# Patient Record
Sex: MALE | Race: BLACK OR AFRICAN AMERICAN | NOT HISPANIC OR LATINO | Employment: OTHER
[De-identification: names, ages, dates, MRNs, and addresses within clinical notes are randomized per-mention and may not be internally consistent; named-entity substitution may affect disease eponyms.]

---

## 2017-02-09 ENCOUNTER — RECORDS - HEALTHEAST (OUTPATIENT)
Dept: ADMINISTRATIVE | Facility: OTHER | Age: 62
End: 2017-02-09

## 2017-03-30 ENCOUNTER — COMMUNICATION - HEALTHEAST (OUTPATIENT)
Dept: FAMILY MEDICINE | Facility: CLINIC | Age: 62
End: 2017-03-30

## 2017-03-30 DIAGNOSIS — I10 HTN (HYPERTENSION): ICD-10-CM

## 2017-06-06 ENCOUNTER — OFFICE VISIT - HEALTHEAST (OUTPATIENT)
Dept: FAMILY MEDICINE | Facility: CLINIC | Age: 62
End: 2017-06-06

## 2017-06-06 DIAGNOSIS — E78.5 HYPERLIPIDEMIA: ICD-10-CM

## 2017-06-06 DIAGNOSIS — E11.9 DIABETES MELLITUS (H): ICD-10-CM

## 2017-06-06 DIAGNOSIS — Z00.00 ROUTINE GENERAL MEDICAL EXAMINATION AT A HEALTH CARE FACILITY: ICD-10-CM

## 2017-06-06 DIAGNOSIS — M16.9 DEGENERATIVE JOINT DISEASE (DJD) OF HIP: ICD-10-CM

## 2017-06-06 DIAGNOSIS — I10 HTN (HYPERTENSION): ICD-10-CM

## 2017-06-06 LAB
CHOLEST SERPL-MCNC: 161 MG/DL
FASTING STATUS PATIENT QL REPORTED: YES
HBA1C MFR BLD: 6.5 % (ref 3.5–6)
HDLC SERPL-MCNC: 43 MG/DL
LDLC SERPL CALC-MCNC: 105 MG/DL
PSA SERPL-MCNC: 0.4 NG/ML (ref 0–4.5)
TRIGL SERPL-MCNC: 63 MG/DL

## 2017-06-06 ASSESSMENT — MIFFLIN-ST. JEOR: SCORE: 1552.37

## 2017-06-08 ENCOUNTER — COMMUNICATION - HEALTHEAST (OUTPATIENT)
Dept: FAMILY MEDICINE | Facility: CLINIC | Age: 62
End: 2017-06-08

## 2017-06-28 ENCOUNTER — COMMUNICATION - HEALTHEAST (OUTPATIENT)
Dept: FAMILY MEDICINE | Facility: CLINIC | Age: 62
End: 2017-06-28

## 2017-06-28 DIAGNOSIS — E78.5 HYPERLIPIDEMIA: ICD-10-CM

## 2017-10-24 ENCOUNTER — COMMUNICATION - HEALTHEAST (OUTPATIENT)
Dept: FAMILY MEDICINE | Facility: CLINIC | Age: 62
End: 2017-10-24

## 2017-10-24 DIAGNOSIS — I10 HTN (HYPERTENSION): ICD-10-CM

## 2017-10-24 DIAGNOSIS — E11.9 DIABETES MELLITUS (H): ICD-10-CM

## 2018-02-15 ENCOUNTER — COMMUNICATION - HEALTHEAST (OUTPATIENT)
Dept: FAMILY MEDICINE | Facility: CLINIC | Age: 63
End: 2018-02-15

## 2018-02-15 DIAGNOSIS — E11.9 DIABETES MELLITUS (H): ICD-10-CM

## 2018-05-21 ENCOUNTER — COMMUNICATION - HEALTHEAST (OUTPATIENT)
Dept: FAMILY MEDICINE | Facility: CLINIC | Age: 63
End: 2018-05-21

## 2018-05-21 DIAGNOSIS — E11.9 DIABETES MELLITUS (H): ICD-10-CM

## 2018-06-22 ENCOUNTER — COMMUNICATION - HEALTHEAST (OUTPATIENT)
Dept: FAMILY MEDICINE | Facility: CLINIC | Age: 63
End: 2018-06-22

## 2018-06-22 DIAGNOSIS — E11.9 DIABETES MELLITUS (H): ICD-10-CM

## 2018-07-02 ENCOUNTER — COMMUNICATION - HEALTHEAST (OUTPATIENT)
Dept: FAMILY MEDICINE | Facility: CLINIC | Age: 63
End: 2018-07-02

## 2018-07-02 DIAGNOSIS — E11.9 DIABETES MELLITUS (H): ICD-10-CM

## 2018-07-18 ENCOUNTER — COMMUNICATION - HEALTHEAST (OUTPATIENT)
Dept: FAMILY MEDICINE | Facility: CLINIC | Age: 63
End: 2018-07-18

## 2018-07-18 DIAGNOSIS — E11.9 DIABETES MELLITUS (H): ICD-10-CM

## 2018-07-30 ENCOUNTER — COMMUNICATION - HEALTHEAST (OUTPATIENT)
Dept: FAMILY MEDICINE | Facility: CLINIC | Age: 63
End: 2018-07-30

## 2018-07-31 ENCOUNTER — OFFICE VISIT - HEALTHEAST (OUTPATIENT)
Dept: FAMILY MEDICINE | Facility: CLINIC | Age: 63
End: 2018-07-31

## 2018-07-31 DIAGNOSIS — E11.9 TYPE 2 DIABETES MELLITUS WITHOUT COMPLICATION, WITHOUT LONG-TERM CURRENT USE OF INSULIN (H): ICD-10-CM

## 2018-07-31 DIAGNOSIS — E78.5 HYPERLIPIDEMIA: ICD-10-CM

## 2018-07-31 DIAGNOSIS — I10 HTN (HYPERTENSION): ICD-10-CM

## 2018-07-31 DIAGNOSIS — Z78.9 ALCOHOL USE: ICD-10-CM

## 2018-07-31 DIAGNOSIS — N52.9 ED (ERECTILE DYSFUNCTION): ICD-10-CM

## 2018-07-31 DIAGNOSIS — R79.89 ELEVATED LFTS: ICD-10-CM

## 2018-07-31 LAB
ALBUMIN SERPL-MCNC: 3.8 G/DL (ref 3.5–5)
ALP SERPL-CCNC: 69 U/L (ref 45–120)
ALT SERPL W P-5'-P-CCNC: 51 U/L (ref 0–45)
ANION GAP SERPL CALCULATED.3IONS-SCNC: 10 MMOL/L (ref 5–18)
AST SERPL W P-5'-P-CCNC: 54 U/L (ref 0–40)
BILIRUB SERPL-MCNC: 0.6 MG/DL (ref 0–1)
BUN SERPL-MCNC: 10 MG/DL (ref 8–22)
CALCIUM SERPL-MCNC: 9.7 MG/DL (ref 8.5–10.5)
CHLORIDE BLD-SCNC: 101 MMOL/L (ref 98–107)
CHOLEST SERPL-MCNC: 177 MG/DL
CO2 SERPL-SCNC: 27 MMOL/L (ref 22–31)
CREAT SERPL-MCNC: 0.84 MG/DL (ref 0.7–1.3)
CREAT UR-MCNC: 412.9 MG/DL
FASTING STATUS PATIENT QL REPORTED: NORMAL
GFR SERPL CREATININE-BSD FRML MDRD: >60 ML/MIN/1.73M2
GLUCOSE BLD-MCNC: 139 MG/DL (ref 70–125)
HBA1C MFR BLD: 6.8 % (ref 3.5–6)
HDLC SERPL-MCNC: 44 MG/DL
LDLC SERPL CALC-MCNC: 116 MG/DL
MICROALBUMIN UR-MCNC: 2.01 MG/DL (ref 0–1.99)
MICROALBUMIN/CREAT UR: 4.9 MG/G
POTASSIUM BLD-SCNC: 4.2 MMOL/L (ref 3.5–5)
PROT SERPL-MCNC: 7.4 G/DL (ref 6–8)
SODIUM SERPL-SCNC: 138 MMOL/L (ref 136–145)
TRIGL SERPL-MCNC: 87 MG/DL

## 2018-08-02 ENCOUNTER — AMBULATORY - HEALTHEAST (OUTPATIENT)
Dept: FAMILY MEDICINE | Facility: CLINIC | Age: 63
End: 2018-08-02

## 2018-08-02 DIAGNOSIS — E11.9 TYPE 2 DIABETES MELLITUS WITHOUT COMPLICATION, WITHOUT LONG-TERM CURRENT USE OF INSULIN (H): ICD-10-CM

## 2018-08-21 ENCOUNTER — COMMUNICATION - HEALTHEAST (OUTPATIENT)
Dept: FAMILY MEDICINE | Facility: CLINIC | Age: 63
End: 2018-08-21

## 2018-08-21 DIAGNOSIS — I10 HTN (HYPERTENSION): ICD-10-CM

## 2018-08-22 ENCOUNTER — COMMUNICATION - HEALTHEAST (OUTPATIENT)
Dept: FAMILY MEDICINE | Facility: CLINIC | Age: 63
End: 2018-08-22

## 2018-08-22 DIAGNOSIS — E11.9 DIABETES MELLITUS (H): ICD-10-CM

## 2018-09-04 ENCOUNTER — COMMUNICATION - HEALTHEAST (OUTPATIENT)
Dept: FAMILY MEDICINE | Facility: CLINIC | Age: 63
End: 2018-09-04

## 2018-10-10 ENCOUNTER — RECORDS - HEALTHEAST (OUTPATIENT)
Dept: GENERAL RADIOLOGY | Facility: CLINIC | Age: 63
End: 2018-10-10

## 2018-10-10 ENCOUNTER — OFFICE VISIT - HEALTHEAST (OUTPATIENT)
Dept: FAMILY MEDICINE | Facility: CLINIC | Age: 63
End: 2018-10-10

## 2018-10-10 DIAGNOSIS — R10.9 ABDOMINAL PAIN: ICD-10-CM

## 2018-10-10 DIAGNOSIS — R80.9 MICROALBUMINURIA: ICD-10-CM

## 2018-10-10 DIAGNOSIS — M25.561 BILATERAL KNEE PAIN: ICD-10-CM

## 2018-10-10 DIAGNOSIS — R10.9 UNSPECIFIED ABDOMINAL PAIN: ICD-10-CM

## 2018-10-10 DIAGNOSIS — E78.5 HYPERLIPIDEMIA: ICD-10-CM

## 2018-10-10 DIAGNOSIS — M17.12 PRIMARY OSTEOARTHRITIS OF LEFT KNEE: ICD-10-CM

## 2018-10-10 DIAGNOSIS — K42.9 UMBILICAL HERNIA WITHOUT OBSTRUCTION AND WITHOUT GANGRENE: ICD-10-CM

## 2018-10-10 DIAGNOSIS — M25.562 BILATERAL KNEE PAIN: ICD-10-CM

## 2018-10-10 DIAGNOSIS — M25.561 PAIN IN RIGHT KNEE: ICD-10-CM

## 2018-10-10 DIAGNOSIS — M25.562 PAIN IN LEFT KNEE: ICD-10-CM

## 2018-10-10 DIAGNOSIS — E11.9 TYPE 2 DIABETES MELLITUS WITHOUT COMPLICATION, WITHOUT LONG-TERM CURRENT USE OF INSULIN (H): ICD-10-CM

## 2018-10-10 DIAGNOSIS — K59.00 CONSTIPATION: ICD-10-CM

## 2018-10-10 DIAGNOSIS — M17.11 PRIMARY OSTEOARTHRITIS OF RIGHT KNEE: ICD-10-CM

## 2018-12-05 ENCOUNTER — OFFICE VISIT - HEALTHEAST (OUTPATIENT)
Dept: FAMILY MEDICINE | Facility: CLINIC | Age: 63
End: 2018-12-05

## 2018-12-05 DIAGNOSIS — M17.11 PRIMARY OSTEOARTHRITIS OF RIGHT KNEE: ICD-10-CM

## 2018-12-05 DIAGNOSIS — E11.9 TYPE 2 DIABETES MELLITUS WITHOUT COMPLICATION, WITHOUT LONG-TERM CURRENT USE OF INSULIN (H): ICD-10-CM

## 2018-12-05 DIAGNOSIS — R80.9 MICROALBUMINURIA: ICD-10-CM

## 2018-12-05 DIAGNOSIS — E11.9 DIABETES MELLITUS (H): ICD-10-CM

## 2018-12-05 DIAGNOSIS — E78.5 HYPERLIPIDEMIA, UNSPECIFIED HYPERLIPIDEMIA TYPE: ICD-10-CM

## 2018-12-05 DIAGNOSIS — J40 BRONCHITIS: ICD-10-CM

## 2018-12-05 DIAGNOSIS — I10 ESSENTIAL HYPERTENSION: ICD-10-CM

## 2018-12-05 DIAGNOSIS — M54.6 LEFT-SIDED THORACIC BACK PAIN, UNSPECIFIED CHRONICITY: ICD-10-CM

## 2018-12-05 LAB — HBA1C MFR BLD: 10.5 % (ref 3.5–6)

## 2018-12-06 ENCOUNTER — COMMUNICATION - HEALTHEAST (OUTPATIENT)
Dept: FAMILY MEDICINE | Facility: CLINIC | Age: 63
End: 2018-12-06

## 2018-12-11 ENCOUNTER — COMMUNICATION - HEALTHEAST (OUTPATIENT)
Dept: SCHEDULING | Facility: CLINIC | Age: 63
End: 2018-12-11

## 2019-01-31 ENCOUNTER — COMMUNICATION - HEALTHEAST (OUTPATIENT)
Dept: FAMILY MEDICINE | Facility: CLINIC | Age: 64
End: 2019-01-31

## 2019-01-31 DIAGNOSIS — E11.9 TYPE 2 DIABETES MELLITUS WITHOUT COMPLICATION, WITHOUT LONG-TERM CURRENT USE OF INSULIN (H): ICD-10-CM

## 2019-01-31 DIAGNOSIS — M54.6 LEFT-SIDED THORACIC BACK PAIN, UNSPECIFIED CHRONICITY: ICD-10-CM

## 2019-02-27 ENCOUNTER — COMMUNICATION - HEALTHEAST (OUTPATIENT)
Dept: FAMILY MEDICINE | Facility: CLINIC | Age: 64
End: 2019-02-27

## 2019-02-27 DIAGNOSIS — E11.9 DIABETES MELLITUS (H): ICD-10-CM

## 2019-04-24 ENCOUNTER — COMMUNICATION - HEALTHEAST (OUTPATIENT)
Dept: FAMILY MEDICINE | Facility: CLINIC | Age: 64
End: 2019-04-24

## 2019-04-24 DIAGNOSIS — M17.11 PRIMARY OSTEOARTHRITIS OF RIGHT KNEE: ICD-10-CM

## 2019-06-09 ENCOUNTER — COMMUNICATION - HEALTHEAST (OUTPATIENT)
Dept: FAMILY MEDICINE | Facility: CLINIC | Age: 64
End: 2019-06-09

## 2019-06-09 DIAGNOSIS — E11.9 TYPE 2 DIABETES MELLITUS WITHOUT COMPLICATION, WITHOUT LONG-TERM CURRENT USE OF INSULIN (H): ICD-10-CM

## 2019-07-30 ENCOUNTER — COMMUNICATION - HEALTHEAST (OUTPATIENT)
Dept: FAMILY MEDICINE | Facility: CLINIC | Age: 64
End: 2019-07-30

## 2019-07-30 DIAGNOSIS — E11.9 DIABETES MELLITUS (H): ICD-10-CM

## 2019-07-30 DIAGNOSIS — I10 HTN (HYPERTENSION): ICD-10-CM

## 2019-07-30 DIAGNOSIS — E11.9 TYPE 2 DIABETES MELLITUS WITHOUT COMPLICATION, WITHOUT LONG-TERM CURRENT USE OF INSULIN (H): ICD-10-CM

## 2019-08-02 ENCOUNTER — COMMUNICATION - HEALTHEAST (OUTPATIENT)
Dept: FAMILY MEDICINE | Facility: CLINIC | Age: 64
End: 2019-08-02

## 2019-08-02 DIAGNOSIS — M17.11 PRIMARY OSTEOARTHRITIS OF RIGHT KNEE: ICD-10-CM

## 2019-08-06 ENCOUNTER — COMMUNICATION - HEALTHEAST (OUTPATIENT)
Dept: FAMILY MEDICINE | Facility: CLINIC | Age: 64
End: 2019-08-06

## 2019-08-21 ENCOUNTER — OFFICE VISIT - HEALTHEAST (OUTPATIENT)
Dept: FAMILY MEDICINE | Facility: CLINIC | Age: 64
End: 2019-08-21

## 2019-08-21 DIAGNOSIS — M17.12 PRIMARY OSTEOARTHRITIS OF LEFT KNEE: ICD-10-CM

## 2019-08-21 DIAGNOSIS — N52.9 ED (ERECTILE DYSFUNCTION): ICD-10-CM

## 2019-08-21 DIAGNOSIS — M54.6 LEFT-SIDED THORACIC BACK PAIN, UNSPECIFIED CHRONICITY: ICD-10-CM

## 2019-08-21 DIAGNOSIS — E11.9 DIABETES MELLITUS (H): ICD-10-CM

## 2019-08-21 DIAGNOSIS — E11.9 TYPE 2 DIABETES MELLITUS WITHOUT COMPLICATION, WITHOUT LONG-TERM CURRENT USE OF INSULIN (H): ICD-10-CM

## 2019-08-21 DIAGNOSIS — E78.5 HYPERLIPIDEMIA, UNSPECIFIED HYPERLIPIDEMIA TYPE: ICD-10-CM

## 2019-08-21 DIAGNOSIS — I10 HTN (HYPERTENSION): ICD-10-CM

## 2019-08-21 DIAGNOSIS — M17.11 PRIMARY OSTEOARTHRITIS OF RIGHT KNEE: ICD-10-CM

## 2019-08-21 DIAGNOSIS — R10.9 ABDOMINAL PAIN: ICD-10-CM

## 2019-08-21 LAB
ALBUMIN SERPL-MCNC: 3.9 G/DL (ref 3.5–5)
ALP SERPL-CCNC: 88 U/L (ref 45–120)
ALT SERPL W P-5'-P-CCNC: 31 U/L (ref 0–45)
ANION GAP SERPL CALCULATED.3IONS-SCNC: 10 MMOL/L (ref 5–18)
AST SERPL W P-5'-P-CCNC: 23 U/L (ref 0–40)
BILIRUB SERPL-MCNC: 0.4 MG/DL (ref 0–1)
BUN SERPL-MCNC: 12 MG/DL (ref 8–22)
CALCIUM SERPL-MCNC: 9.9 MG/DL (ref 8.5–10.5)
CHLORIDE BLD-SCNC: 101 MMOL/L (ref 98–107)
CHOLEST SERPL-MCNC: 124 MG/DL
CO2 SERPL-SCNC: 27 MMOL/L (ref 22–31)
CREAT SERPL-MCNC: 1.06 MG/DL (ref 0.7–1.3)
FASTING STATUS PATIENT QL REPORTED: NO
GFR SERPL CREATININE-BSD FRML MDRD: >60 ML/MIN/1.73M2
GLUCOSE BLD-MCNC: 154 MG/DL (ref 70–125)
HBA1C MFR BLD: 8.2 % (ref 3.5–6)
HDLC SERPL-MCNC: 39 MG/DL
LDLC SERPL CALC-MCNC: 66 MG/DL
POTASSIUM BLD-SCNC: 4.6 MMOL/L (ref 3.5–5)
PROT SERPL-MCNC: 7.6 G/DL (ref 6–8)
SODIUM SERPL-SCNC: 138 MMOL/L (ref 136–145)
TRIGL SERPL-MCNC: 93 MG/DL

## 2019-08-22 ENCOUNTER — COMMUNICATION - HEALTHEAST (OUTPATIENT)
Dept: FAMILY MEDICINE | Facility: CLINIC | Age: 64
End: 2019-08-22

## 2019-10-28 ENCOUNTER — COMMUNICATION - HEALTHEAST (OUTPATIENT)
Dept: FAMILY MEDICINE | Facility: CLINIC | Age: 64
End: 2019-10-28

## 2019-10-28 DIAGNOSIS — I10 ESSENTIAL HYPERTENSION: ICD-10-CM

## 2019-11-25 ENCOUNTER — OFFICE VISIT - HEALTHEAST (OUTPATIENT)
Dept: FAMILY MEDICINE | Facility: CLINIC | Age: 64
End: 2019-11-25

## 2019-11-25 DIAGNOSIS — I10 ESSENTIAL HYPERTENSION: ICD-10-CM

## 2019-11-25 DIAGNOSIS — M16.9 OSTEOARTHRITIS OF HIP, UNSPECIFIED LATERALITY, UNSPECIFIED OSTEOARTHRITIS TYPE: ICD-10-CM

## 2019-11-25 DIAGNOSIS — J98.01 BRONCHOSPASM: ICD-10-CM

## 2019-11-25 DIAGNOSIS — I10 HTN (HYPERTENSION): ICD-10-CM

## 2019-11-25 DIAGNOSIS — E11.9 TYPE 2 DIABETES MELLITUS WITHOUT COMPLICATION, WITHOUT LONG-TERM CURRENT USE OF INSULIN (H): ICD-10-CM

## 2019-11-25 DIAGNOSIS — E11.9 DIABETES MELLITUS (H): ICD-10-CM

## 2019-11-25 LAB — HBA1C MFR BLD: 7.8 % (ref 3.5–6)

## 2019-11-25 ASSESSMENT — MIFFLIN-ST. JEOR: SCORE: 1627.21

## 2019-12-02 ENCOUNTER — COMMUNICATION - HEALTHEAST (OUTPATIENT)
Dept: FAMILY MEDICINE | Facility: CLINIC | Age: 64
End: 2019-12-02

## 2019-12-25 ENCOUNTER — COMMUNICATION - HEALTHEAST (OUTPATIENT)
Dept: FAMILY MEDICINE | Facility: CLINIC | Age: 64
End: 2019-12-25

## 2019-12-25 DIAGNOSIS — M16.9 OSTEOARTHRITIS OF HIP, UNSPECIFIED LATERALITY, UNSPECIFIED OSTEOARTHRITIS TYPE: ICD-10-CM

## 2020-03-16 ENCOUNTER — COMMUNICATION - HEALTHEAST (OUTPATIENT)
Dept: FAMILY MEDICINE | Facility: CLINIC | Age: 65
End: 2020-03-16

## 2020-03-16 DIAGNOSIS — M16.9 OSTEOARTHRITIS OF HIP, UNSPECIFIED LATERALITY, UNSPECIFIED OSTEOARTHRITIS TYPE: ICD-10-CM

## 2020-03-16 DIAGNOSIS — E11.9 TYPE 2 DIABETES MELLITUS WITHOUT COMPLICATION, WITHOUT LONG-TERM CURRENT USE OF INSULIN (H): ICD-10-CM

## 2020-03-16 DIAGNOSIS — I10 HTN (HYPERTENSION): ICD-10-CM

## 2020-05-04 ENCOUNTER — COMMUNICATION - HEALTHEAST (OUTPATIENT)
Dept: GERIATRIC MEDICINE | Facility: CLINIC | Age: 65
End: 2020-05-04

## 2020-06-10 ENCOUNTER — COMMUNICATION - HEALTHEAST (OUTPATIENT)
Dept: FAMILY MEDICINE | Facility: CLINIC | Age: 65
End: 2020-06-10

## 2020-06-23 ENCOUNTER — COMMUNICATION - HEALTHEAST (OUTPATIENT)
Dept: FAMILY MEDICINE | Facility: CLINIC | Age: 65
End: 2020-06-23

## 2020-07-09 ENCOUNTER — COMMUNICATION - HEALTHEAST (OUTPATIENT)
Dept: FAMILY MEDICINE | Facility: CLINIC | Age: 65
End: 2020-07-09

## 2020-07-09 DIAGNOSIS — M16.9 OSTEOARTHRITIS OF HIP, UNSPECIFIED LATERALITY, UNSPECIFIED OSTEOARTHRITIS TYPE: ICD-10-CM

## 2020-07-22 ENCOUNTER — COMMUNICATION - HEALTHEAST (OUTPATIENT)
Dept: FAMILY MEDICINE | Facility: CLINIC | Age: 65
End: 2020-07-22

## 2020-09-06 ENCOUNTER — COMMUNICATION - HEALTHEAST (OUTPATIENT)
Dept: FAMILY MEDICINE | Facility: CLINIC | Age: 65
End: 2020-09-06

## 2020-09-06 DIAGNOSIS — I10 ESSENTIAL HYPERTENSION: ICD-10-CM

## 2020-09-06 DIAGNOSIS — E11.9 DIABETES MELLITUS (H): ICD-10-CM

## 2020-10-06 ENCOUNTER — COMMUNICATION - HEALTHEAST (OUTPATIENT)
Dept: FAMILY MEDICINE | Facility: CLINIC | Age: 65
End: 2020-10-06

## 2020-10-06 DIAGNOSIS — E11.9 DIABETES MELLITUS (H): ICD-10-CM

## 2020-10-06 DIAGNOSIS — I10 ESSENTIAL HYPERTENSION: ICD-10-CM

## 2020-10-08 ENCOUNTER — COMMUNICATION - HEALTHEAST (OUTPATIENT)
Dept: FAMILY MEDICINE | Facility: CLINIC | Age: 65
End: 2020-10-08

## 2020-10-12 ENCOUNTER — COMMUNICATION - HEALTHEAST (OUTPATIENT)
Dept: FAMILY MEDICINE | Facility: CLINIC | Age: 65
End: 2020-10-12

## 2020-10-12 DIAGNOSIS — I10 ESSENTIAL HYPERTENSION: ICD-10-CM

## 2020-10-12 DIAGNOSIS — M16.9 OSTEOARTHRITIS OF HIP, UNSPECIFIED LATERALITY, UNSPECIFIED OSTEOARTHRITIS TYPE: ICD-10-CM

## 2020-10-22 ENCOUNTER — COMMUNICATION - HEALTHEAST (OUTPATIENT)
Dept: FAMILY MEDICINE | Facility: CLINIC | Age: 65
End: 2020-10-22

## 2020-10-22 DIAGNOSIS — E11.9 DIABETES MELLITUS (H): ICD-10-CM

## 2020-10-22 DIAGNOSIS — I10 ESSENTIAL HYPERTENSION: ICD-10-CM

## 2020-11-03 ENCOUNTER — COMMUNICATION - HEALTHEAST (OUTPATIENT)
Dept: FAMILY MEDICINE | Facility: CLINIC | Age: 65
End: 2020-11-03

## 2020-11-09 ENCOUNTER — OFFICE VISIT - HEALTHEAST (OUTPATIENT)
Dept: FAMILY MEDICINE | Facility: CLINIC | Age: 65
End: 2020-11-09

## 2020-11-09 DIAGNOSIS — J98.01 BRONCHOSPASM: ICD-10-CM

## 2020-11-09 DIAGNOSIS — R80.9 MICROALBUMINURIA: ICD-10-CM

## 2020-11-09 DIAGNOSIS — E11.29 TYPE 2 DIABETES MELLITUS WITH MICROALBUMINURIA, WITHOUT LONG-TERM CURRENT USE OF INSULIN (H): ICD-10-CM

## 2020-11-09 DIAGNOSIS — I10 ESSENTIAL HYPERTENSION: ICD-10-CM

## 2020-11-09 DIAGNOSIS — I10 HTN (HYPERTENSION): ICD-10-CM

## 2020-11-09 DIAGNOSIS — E11.9 TYPE 2 DIABETES MELLITUS WITHOUT COMPLICATION, WITHOUT LONG-TERM CURRENT USE OF INSULIN (H): ICD-10-CM

## 2020-11-09 DIAGNOSIS — E66.01 MORBID OBESITY (H): ICD-10-CM

## 2020-11-09 DIAGNOSIS — Z12.11 COLON CANCER SCREENING: ICD-10-CM

## 2020-11-09 DIAGNOSIS — M16.9 OSTEOARTHRITIS OF HIP, UNSPECIFIED LATERALITY, UNSPECIFIED OSTEOARTHRITIS TYPE: ICD-10-CM

## 2020-11-09 DIAGNOSIS — E11.9 DIABETES MELLITUS (H): ICD-10-CM

## 2020-11-09 DIAGNOSIS — E78.5 HYPERLIPIDEMIA, UNSPECIFIED HYPERLIPIDEMIA TYPE: ICD-10-CM

## 2020-11-09 DIAGNOSIS — R80.9 TYPE 2 DIABETES MELLITUS WITH MICROALBUMINURIA, WITHOUT LONG-TERM CURRENT USE OF INSULIN (H): ICD-10-CM

## 2020-11-17 ENCOUNTER — COMMUNICATION - HEALTHEAST (OUTPATIENT)
Dept: GERIATRIC MEDICINE | Facility: CLINIC | Age: 65
End: 2020-11-17

## 2020-11-18 ENCOUNTER — COMMUNICATION - HEALTHEAST (OUTPATIENT)
Dept: GERIATRIC MEDICINE | Facility: CLINIC | Age: 65
End: 2020-11-18

## 2020-11-18 ASSESSMENT — ACTIVITIES OF DAILY LIVING (ADL): DEPENDENT_IADLS:: CLEANING;COOKING;LAUNDRY;SHOPPING;MEAL PREPARATION;TRANSPORTATION

## 2020-11-27 ENCOUNTER — COMMUNICATION - HEALTHEAST (OUTPATIENT)
Dept: GERIATRIC MEDICINE | Facility: CLINIC | Age: 65
End: 2020-11-27

## 2020-11-30 ENCOUNTER — RECORDS - HEALTHEAST (OUTPATIENT)
Dept: ADMINISTRATIVE | Facility: OTHER | Age: 65
End: 2020-11-30

## 2021-01-01 ENCOUNTER — PATIENT OUTREACH (OUTPATIENT)
Dept: GERIATRIC MEDICINE | Facility: CLINIC | Age: 66
End: 2021-01-01

## 2021-01-01 ENCOUNTER — COMMUNICATION - HEALTHEAST (OUTPATIENT)
Dept: FAMILY MEDICINE | Facility: CLINIC | Age: 66
End: 2021-01-01

## 2021-01-01 ENCOUNTER — RECORDS - HEALTHEAST (OUTPATIENT)
Dept: FAMILY MEDICINE | Facility: CLINIC | Age: 66
End: 2021-01-01

## 2021-01-01 ENCOUNTER — PATIENT OUTREACH (OUTPATIENT)
Dept: GERIATRIC MEDICINE | Facility: CLINIC | Age: 66
End: 2021-01-01
Payer: COMMERCIAL

## 2021-01-01 ENCOUNTER — CARE COORDINATION (OUTPATIENT)
Dept: GERIATRIC MEDICINE | Facility: CLINIC | Age: 66
End: 2021-01-01
Payer: COMMERCIAL

## 2021-01-01 ENCOUNTER — COMMUNICATION - HEALTHEAST (OUTPATIENT)
Dept: TELEHEALTH | Facility: CLINIC | Age: 66
End: 2021-01-01

## 2021-01-01 ENCOUNTER — OFFICE VISIT - HEALTHEAST (OUTPATIENT)
Dept: FAMILY MEDICINE | Facility: CLINIC | Age: 66
End: 2021-01-01

## 2021-01-01 ENCOUNTER — TELEPHONE (OUTPATIENT)
Dept: FAMILY MEDICINE | Facility: CLINIC | Age: 66
End: 2021-01-01
Payer: COMMERCIAL

## 2021-01-01 ENCOUNTER — OFFICE VISIT (OUTPATIENT)
Dept: FAMILY MEDICINE | Facility: CLINIC | Age: 66
End: 2021-01-01
Payer: COMMERCIAL

## 2021-01-01 ENCOUNTER — COMMUNICATION - HEALTHEAST (OUTPATIENT)
Dept: GERIATRIC MEDICINE | Facility: CLINIC | Age: 66
End: 2021-01-01

## 2021-01-01 VITALS — BODY MASS INDEX: 32.96 KG/M2 | WEIGHT: 192 LBS

## 2021-01-01 VITALS
DIASTOLIC BLOOD PRESSURE: 70 MMHG | HEART RATE: 67 BPM | SYSTOLIC BLOOD PRESSURE: 127 MMHG | WEIGHT: 194 LBS | HEIGHT: 64 IN | BODY MASS INDEX: 33.12 KG/M2

## 2021-01-01 VITALS
DIASTOLIC BLOOD PRESSURE: 58 MMHG | BODY MASS INDEX: 35.42 KG/M2 | WEIGHT: 207.5 LBS | RESPIRATION RATE: 18 BRPM | SYSTOLIC BLOOD PRESSURE: 116 MMHG | HEART RATE: 75 BPM | HEIGHT: 64 IN

## 2021-01-01 VITALS — WEIGHT: 200 LBS | BODY MASS INDEX: 34.33 KG/M2

## 2021-01-01 VITALS — BODY MASS INDEX: 34.33 KG/M2 | WEIGHT: 200 LBS

## 2021-01-01 VITALS — WEIGHT: 208 LBS | BODY MASS INDEX: 35.7 KG/M2

## 2021-01-01 VITALS — WEIGHT: 191 LBS | BODY MASS INDEX: 32.61 KG/M2 | HEIGHT: 64 IN

## 2021-01-01 DIAGNOSIS — M16.9 OSTEOARTHRITIS OF HIP, UNSPECIFIED LATERALITY, UNSPECIFIED OSTEOARTHRITIS TYPE: ICD-10-CM

## 2021-01-01 DIAGNOSIS — R53.83 OTHER FATIGUE: ICD-10-CM

## 2021-01-01 DIAGNOSIS — E11.9 DIABETES MELLITUS (H): ICD-10-CM

## 2021-01-01 DIAGNOSIS — I10 ESSENTIAL HYPERTENSION: ICD-10-CM

## 2021-01-01 DIAGNOSIS — E78.5 HYPERLIPIDEMIA, UNSPECIFIED HYPERLIPIDEMIA TYPE: ICD-10-CM

## 2021-01-01 DIAGNOSIS — E11.9 TYPE 2 DIABETES MELLITUS WITHOUT COMPLICATION, WITHOUT LONG-TERM CURRENT USE OF INSULIN (H): ICD-10-CM

## 2021-01-01 DIAGNOSIS — I10 HTN (HYPERTENSION): Primary | ICD-10-CM

## 2021-01-01 DIAGNOSIS — R80.9 MICROALBUMINURIA: ICD-10-CM

## 2021-01-01 DIAGNOSIS — I10 HYPERTENSION: ICD-10-CM

## 2021-01-01 DIAGNOSIS — E11.29 TYPE 2 DIABETES MELLITUS WITH OTHER DIABETIC KIDNEY COMPLICATION (H): ICD-10-CM

## 2021-01-01 DIAGNOSIS — Z71.85 IMMUNIZATION COUNSELING: ICD-10-CM

## 2021-01-01 DIAGNOSIS — I10 BENIGN ESSENTIAL HYPERTENSION: ICD-10-CM

## 2021-01-01 DIAGNOSIS — I10 HTN (HYPERTENSION): ICD-10-CM

## 2021-01-01 DIAGNOSIS — J98.01 BRONCHOSPASM: ICD-10-CM

## 2021-01-01 DIAGNOSIS — R06.2 WHEEZING: ICD-10-CM

## 2021-01-01 DIAGNOSIS — M16.0 PRIMARY OSTEOARTHRITIS OF BOTH HIPS: ICD-10-CM

## 2021-01-01 DIAGNOSIS — E11.29 TYPE 2 DIABETES MELLITUS WITH OTHER DIABETIC KIDNEY COMPLICATION (H): Primary | ICD-10-CM

## 2021-01-01 DIAGNOSIS — E66.01 MORBID OBESITY (H): ICD-10-CM

## 2021-01-01 DIAGNOSIS — R80.9 TYPE 2 DIABETES MELLITUS WITH MICROALBUMINURIA, WITHOUT LONG-TERM CURRENT USE OF INSULIN (H): ICD-10-CM

## 2021-01-01 DIAGNOSIS — Z12.11 COLON CANCER SCREENING: ICD-10-CM

## 2021-01-01 DIAGNOSIS — E11.9 DIABETES MELLITUS (H): Primary | ICD-10-CM

## 2021-01-01 DIAGNOSIS — Z00.00 ENCOUNTER FOR MEDICARE ANNUAL WELLNESS EXAM: Primary | ICD-10-CM

## 2021-01-01 DIAGNOSIS — R35.1 NOCTURIA: ICD-10-CM

## 2021-01-01 DIAGNOSIS — I10 BENIGN ESSENTIAL HYPERTENSION: Primary | ICD-10-CM

## 2021-01-01 DIAGNOSIS — E11.29 TYPE 2 DIABETES MELLITUS WITH MICROALBUMINURIA, WITHOUT LONG-TERM CURRENT USE OF INSULIN (H): ICD-10-CM

## 2021-01-01 LAB
ALBUMIN SERPL-MCNC: 3.9 G/DL (ref 3.5–5)
ALP SERPL-CCNC: 99 U/L (ref 45–120)
ALT SERPL W P-5'-P-CCNC: 12 U/L (ref 0–45)
ANION GAP SERPL CALCULATED.3IONS-SCNC: 12 MMOL/L (ref 5–18)
AST SERPL W P-5'-P-CCNC: 10 U/L (ref 0–40)
BILIRUB SERPL-MCNC: 0.3 MG/DL (ref 0–1)
BUN SERPL-MCNC: 13 MG/DL (ref 8–22)
CALCIUM SERPL-MCNC: 9.7 MG/DL (ref 8.5–10.5)
CHLORIDE BLD-SCNC: 102 MMOL/L (ref 98–107)
CHOLEST SERPL-MCNC: 124 MG/DL
CO2 SERPL-SCNC: 24 MMOL/L (ref 22–31)
CREAT SERPL-MCNC: 1.39 MG/DL (ref 0.7–1.3)
CREAT UR-MCNC: 253 MG/DL
ERYTHROCYTE [DISTWIDTH] IN BLOOD BY AUTOMATED COUNT: 14 % (ref 10–15)
FASTING STATUS PATIENT QL REPORTED: ABNORMAL
GFR SERPL CREATININE-BSD FRML MDRD: 52 ML/MIN/1.73M2
GLUCOSE BLD-MCNC: 293 MG/DL (ref 70–125)
HBA1C MFR BLD: 10.8 % (ref 0–5.6)
HCT VFR BLD AUTO: 40.1 % (ref 40–53)
HDLC SERPL-MCNC: 33 MG/DL
HGB BLD-MCNC: 12.6 G/DL (ref 13.3–17.7)
LDLC SERPL CALC-MCNC: 77 MG/DL
MCH RBC QN AUTO: 26.4 PG (ref 26.5–33)
MCHC RBC AUTO-ENTMCNC: 31.4 G/DL (ref 31.5–36.5)
MCV RBC AUTO: 84 FL (ref 78–100)
MICROALBUMIN UR-MCNC: 6.39 MG/DL (ref 0–1.99)
MICROALBUMIN/CREAT UR: 25.3 MG/G CR
PLATELET # BLD AUTO: 494 10E3/UL (ref 150–450)
POTASSIUM BLD-SCNC: 4.1 MMOL/L (ref 3.5–5)
PROT SERPL-MCNC: 7.6 G/DL (ref 6–8)
PSA SERPL-MCNC: 0.4 UG/L (ref 0–4.5)
RBC # BLD AUTO: 4.77 10E6/UL (ref 4.4–5.9)
SODIUM SERPL-SCNC: 138 MMOL/L (ref 136–145)
TRIGL SERPL-MCNC: 71 MG/DL
WBC # BLD AUTO: 7.1 10E3/UL (ref 4–11)

## 2021-01-01 PROCEDURE — 85027 COMPLETE CBC AUTOMATED: CPT | Performed by: FAMILY MEDICINE

## 2021-01-01 PROCEDURE — 84153 ASSAY OF PSA TOTAL: CPT | Performed by: FAMILY MEDICINE

## 2021-01-01 PROCEDURE — 82043 UR ALBUMIN QUANTITATIVE: CPT | Performed by: FAMILY MEDICINE

## 2021-01-01 PROCEDURE — 80053 COMPREHEN METABOLIC PANEL: CPT | Performed by: FAMILY MEDICINE

## 2021-01-01 PROCEDURE — G0402 INITIAL PREVENTIVE EXAM: HCPCS | Performed by: FAMILY MEDICINE

## 2021-01-01 PROCEDURE — 80061 LIPID PANEL: CPT | Performed by: FAMILY MEDICINE

## 2021-01-01 PROCEDURE — 36415 COLL VENOUS BLD VENIPUNCTURE: CPT | Performed by: FAMILY MEDICINE

## 2021-01-01 PROCEDURE — 83036 HEMOGLOBIN GLYCOSYLATED A1C: CPT | Performed by: FAMILY MEDICINE

## 2021-01-01 RX ORDER — ACETAMINOPHEN 500 MG
TABLET ORAL
Qty: 120 TABLET | Refills: 3 | Status: SHIPPED | OUTPATIENT
Start: 2021-01-01

## 2021-01-01 RX ORDER — ALBUTEROL SULFATE 90 UG/1
AEROSOL, METERED RESPIRATORY (INHALATION)
COMMUNITY
Start: 2021-01-01 | End: 2021-01-01

## 2021-01-01 RX ORDER — ACETAMINOPHEN 500 MG
TABLET ORAL
COMMUNITY
Start: 2020-11-09 | End: 2021-01-01

## 2021-01-01 RX ORDER — ATORVASTATIN CALCIUM 10 MG/1
10 TABLET, FILM COATED ORAL DAILY
Qty: 90 TABLET | Refills: 1 | Status: SHIPPED | OUTPATIENT
Start: 2021-01-01 | End: 2021-01-01

## 2021-01-01 RX ORDER — ATORVASTATIN CALCIUM 10 MG/1
10 TABLET, FILM COATED ORAL DAILY
Qty: 10 TABLET | Refills: 0 | Status: SHIPPED | OUTPATIENT
Start: 2021-01-01 | End: 2021-01-01

## 2021-01-01 RX ORDER — AMLODIPINE BESYLATE 10 MG/1
TABLET ORAL
COMMUNITY
Start: 2021-01-01 | End: 2021-01-01

## 2021-01-01 RX ORDER — ATORVASTATIN CALCIUM 10 MG/1
10 TABLET, FILM COATED ORAL DAILY
Qty: 90 TABLET | Refills: 0 | Status: SHIPPED | OUTPATIENT
Start: 2021-01-01

## 2021-01-01 RX ORDER — AMLODIPINE BESYLATE 10 MG/1
10 TABLET ORAL DAILY
Qty: 10 TABLET | Refills: 0 | Status: CANCELLED | OUTPATIENT
Start: 2021-01-01

## 2021-01-01 RX ORDER — AMLODIPINE BESYLATE 10 MG/1
10 TABLET ORAL DAILY
Qty: 90 TABLET | Refills: 1 | Status: SHIPPED | OUTPATIENT
Start: 2021-01-01 | End: 2021-01-01

## 2021-01-01 RX ORDER — ALBUTEROL SULFATE 90 UG/1
AEROSOL, METERED RESPIRATORY (INHALATION)
Qty: 18 G | Refills: 1 | Status: SHIPPED | OUTPATIENT
Start: 2021-01-01

## 2021-01-01 RX ORDER — CELECOXIB 200 MG/1
CAPSULE ORAL
OUTPATIENT
Start: 2021-01-01

## 2021-01-01 RX ORDER — LISINOPRIL 20 MG/1
20 TABLET ORAL DAILY
Qty: 14 TABLET | Refills: 0 | Status: SHIPPED | OUTPATIENT
Start: 2021-01-01 | End: 2021-01-01

## 2021-01-01 RX ORDER — AMLODIPINE BESYLATE 10 MG/1
10 TABLET ORAL DAILY
Qty: 15 TABLET | Refills: 0 | Status: SHIPPED | OUTPATIENT
Start: 2021-01-01 | End: 2021-01-01

## 2021-01-01 RX ORDER — AMLODIPINE BESYLATE 10 MG/1
10 TABLET ORAL DAILY
Qty: 10 TABLET | Refills: 0 | Status: SHIPPED | OUTPATIENT
Start: 2021-01-01 | End: 2021-01-01

## 2021-01-01 RX ORDER — CELECOXIB 200 MG/1
CAPSULE ORAL
COMMUNITY
Start: 2021-01-01 | End: 2021-01-01

## 2021-01-01 RX ORDER — LISINOPRIL 40 MG/1
40 TABLET ORAL DAILY
Qty: 90 TABLET | Refills: 1 | Status: SHIPPED | OUTPATIENT
Start: 2021-01-01

## 2021-01-01 RX ORDER — ATORVASTATIN CALCIUM 10 MG/1
10 TABLET, FILM COATED ORAL DAILY
Qty: 10 TABLET | Refills: 0 | Status: CANCELLED | OUTPATIENT
Start: 2021-01-01

## 2021-01-01 RX ORDER — ATORVASTATIN CALCIUM 10 MG/1
10 TABLET, FILM COATED ORAL DAILY
Qty: 15 TABLET | Refills: 0 | Status: SHIPPED | OUTPATIENT
Start: 2021-01-01 | End: 2021-01-01

## 2021-01-01 RX ORDER — AMLODIPINE BESYLATE 10 MG/1
10 TABLET ORAL DAILY
Qty: 90 TABLET | Refills: 0 | Status: SHIPPED | OUTPATIENT
Start: 2021-01-01

## 2021-01-01 RX ORDER — ATORVASTATIN CALCIUM 10 MG/1
TABLET, FILM COATED ORAL
COMMUNITY
Start: 2021-01-01 | End: 2021-01-01

## 2021-01-01 ASSESSMENT — ACTIVITIES OF DAILY LIVING (ADL)
CURRENT_FUNCTION: TELEPHONE REQUIRES ASSISTANCE
DEPENDENT_IADLS:: CLEANING;COOKING;LAUNDRY;SHOPPING;MEAL PREPARATION;TRANSPORTATION
CURRENT_FUNCTION: LAUNDRY REQUIRES ASSISTANCE
CURRENT_FUNCTION: BATHING REQUIRES ASSISTANCE
CURRENT_FUNCTION: SHOPPING REQUIRES ASSISTANCE
CURRENT_FUNCTION: MONEY MANAGEMENT REQUIRES ASSISTANCE
CURRENT_FUNCTION: PREPARING MEALS REQUIRES ASSISTANCE
CURRENT_FUNCTION: HOUSEWORK REQUIRES ASSISTANCE
CURRENT_FUNCTION: MEDICATION ADMINISTRATION REQUIRES ASSISTANCE
CURRENT_FUNCTION: TRANSPORTATION REQUIRES ASSISTANCE

## 2021-01-01 ASSESSMENT — MIFFLIN-ST. JEOR: SCORE: 1558.73

## 2021-05-28 NOTE — TELEPHONE ENCOUNTER
RN cannot approve Refill Request    RN can NOT refill this medication med is not covered by policy/route to provider. Last office visit: 12/5/2018 Ethan Colunga MD Last Physical: 6/6/2017 Last MTM visit: Visit date not found Last visit same specialty: 12/5/2018 Ethan Colunga MD.  Next visit within 3 mo: Visit date not found  Next physical within 3 mo: Visit date not found      Mabel Horton, [facility] Triage/Med Refill 4/24/2019    Requested Prescriptions   Pending Prescriptions Disp Refills     diclofenac (VOLTAREN) 75 MG EC tablet [Pharmacy Med Name: DICLOFENAC SOD EC 75 MG TAB] 60 tablet 2     Sig: TAKE 1 TABLET BY MOUTH TWICE A DAY       There is no refill protocol information for this order

## 2021-05-29 NOTE — TELEPHONE ENCOUNTER
Refill Approved    Rx renewed per Medication Renewal Policy. Medication was last renewed on 1/31/19.    Molly Unger, [facility] Triage/Med Refill 6/10/2019     Requested Prescriptions   Pending Prescriptions Disp Refills     atorvastatin (LIPITOR) 10 MG tablet [Pharmacy Med Name: ATORVASTATIN 10 MG TABLET] 30 tablet 2     Sig: TAKE 1 TABLET BY MOUTH EVERY DAY       Statins Refill Protocol (Hmg CoA Reductase Inhibitors) Passed - 6/9/2019 12:33 PM        Passed - PCP or prescribing provider visit in past 12 months      Last office visit with prescriber/PCP: 12/5/2018 Ethan Colunga MD OR same dept: 12/5/2018 Ethan Colunga MD OR same specialty: 12/5/2018 Ethan Colunga MD  Last physical: 6/6/2017 Last MTM visit: Visit date not found   Next visit within 3 mo: Visit date not found  Next physical within 3 mo: Visit date not found  Prescriber OR PCP: Ethan Colunga MD  Last diagnosis associated with med order: 1. Type 2 diabetes mellitus without complication, without long-term current use of insulin  - atorvastatin (LIPITOR) 10 MG tablet [Pharmacy Med Name: ATORVASTATIN 10 MG TABLET]; TAKE 1 TABLET BY MOUTH EVERY DAY  Dispense: 30 tablet; Refill: 2    If protocol passes may refill for 12 months if within 3 months of last provider visit (or a total of 15 months).

## 2021-05-30 NOTE — TELEPHONE ENCOUNTER
ARDEN WALKED IN AND STATES THAT HE DOES NOT HAVE ANY REFILLS AND IS WONDERING IF DR GARCIA COULD REFILL FOR HIM ALL MEDICATIONS HE HAS BEEN OUT OF TOWN SINCE THE FIRST OF July  PATIENTS PHONE IS NOT WORKING RIGHT NOW AND HE STATES TO CALL [phone] HIS ROOMATES NUMBER BUT STATES THAT HE DOES NOT WANT HIS PHONE NUMBER TAKEN OUT AS HE WILL BE TURNING THEM BACK ON NEXT MONTH

## 2021-05-30 NOTE — TELEPHONE ENCOUNTER
Name of form/paperwork: Other:  Order form from [facility] for a Back support, Right and Left Knee supports  Have you been seen for this request: N/A  Do we have the form: Yes- Corrine states she faxed form on 7/17/19 for Provider to sign.  When is form needed by: Asap  How would you like the form returned: Fax  Fax Number: Fax number on form.  Patient Notified form requests are processed in 3-5 business days: N/A  (If patient needs form sooner, please note that in this message.)  Okay to leave a detailed message? Yes at: [phone], Ext. [phone]

## 2021-05-31 NOTE — TELEPHONE ENCOUNTER
Tell the patient that I sent in prescriptions for his amlodipine atorvastatin lisinopril and metformin.  All for 1 month supply.    He needs to make an appointment for follow-up visit within the next 2-3 weeks

## 2021-05-31 NOTE — TELEPHONE ENCOUNTER
Called patient and relayed the below message. Patient expressed understanding, 3 month appointment scheduled.

## 2021-05-31 NOTE — TELEPHONE ENCOUNTER
PT CAME INTO CLINIC REQUESTING FOR A REFILL FOR diclofenac (VOLTAREN) 75 MG EC tablet. PT DOES HAVE A FUTURE APPT ON 8/13/19 @ 2:45 PM. PT IS INFORMED THAT IT IS NOT GUARANTEED THAT PT WILL GET HIS REFILL OR NOT UNTIL HE IS SEEN. PLEASE CONTACT PT [phone] WHEN PRESCRIPTION IS REFILL.

## 2021-05-31 NOTE — TELEPHONE ENCOUNTER
RN cannot approve Refill Request    RN can NOT refill this medication med is not covered by policy/route to provider     . Last office visit: Visit date not found Last Physical: Visit date not found Last MTM visit: Visit date not found Last visit same specialty: 12/5/2018 Ethan Colunga MD.  Next visit within 3 mo: Visit date not found  Next physical within 3 mo: Visit date not found      Molly Unger, [facility] Triage/Med Refill 8/2/2019    Requested Prescriptions   Pending Prescriptions Disp Refills     diclofenac (VOLTAREN) 75 MG EC tablet [Pharmacy Med Name: DICLOFENAC SOD EC 75 MG TAB] 60 tablet 0     Sig: TAKE 1 TABLET BY MOUTH TWICE A DAY       There is no refill protocol information for this order

## 2021-05-31 NOTE — TELEPHONE ENCOUNTER
Spoke to Caroline from [facility]. Relayed message from provider to them. They will have patient schedule appointment to be seen in regards to request.

## 2021-05-31 NOTE — TELEPHONE ENCOUNTER
Please tell the caller from [facility] that I am not signing these forms.  Patient has never been seen for these problems and if he does come in we will discuss this with the patient.    Quit sending the forms I am not going to sign them

## 2021-05-31 NOTE — TELEPHONE ENCOUNTER
RN returned call to patient to inform him the Rx. He states that [facility] told him they don't have the Voltaren Rx. RN called [facility] to verify receipt of [facility]. [facility] said the Rx is waiting for him.  RN informed patient.     Maria Esther Phoenix RN, [facility] Med Refill/Triage, 8/6/2019 4:01 PM      *Ok to leave a detailed message upon call back

## 2021-05-31 NOTE — TELEPHONE ENCOUNTER
----- Message from Ethan Colunga MD sent at 8/22/2019 12:44 PM CDT -----  Please contact this patient, let him know that the blood work looked good the liver kidney electrolytes    The A1c was down to 8.2.  I like him to work harder on his diet trying to decrease carbohydrates rice bread pasta sugar alcohol pop.  The cholesterol level looked good.    Stand the same medications that we talked about yesterday and follow-up for recheck in 2 to 3 months

## 2021-05-31 NOTE — PROGRESS NOTES
Subjective: Patient comes in for follow-up he has not been in for about 9 months.    In December A1c was 10.5    He states his been on metformin 500 mg 2 twice a day he takes amlodipine and lisinopril for blood pressure    He is had a cough for about 2 years.  After evaluation and discussion I think it is from the lisinopril we will stop that and change to losartan 100 mg a day stay on the amlodipine 10 mg a day.    I am not sure if he is on the atorvastatin he did not bring in his medicines I did check lipid but sent a prescription for this as well as the metformin and amlodipine and losartan and also he should take an aspirin daily    The diclofenac has not been helpful enough for his musculoskeletal problems he does have some degenerative arthritis in his lower back also has had bilateral knee problems he does have moderate osteoarthritis on previous x-ray.  I discussed options including injection.  I need to have him see the orthopedist.    He will probably benefit from some knee braces.,  Await their evaluation.    I did stop the diclofenac and changed him over the naproxen.  He has normal renal function in the past.    He continues to smoke we discussed the need to stop.    Tobacco status: He  reports that he has been smoking cigarettes.  He started smoking about 4 years ago. He has never used smokeless tobacco.    Patient Active Problem List    Diagnosis Date Noted     Microalbuminuria 10/10/2018     Hyperlipidemia 05/14/2015     HTN (hypertension) 05/14/2015     Diabetes mellitus (H) 05/14/2015     DJD (degenerative joint disease) of hip 05/14/2015     Osteoarthrosis, pelvic region and thigh 05/17/2013       Current Outpatient Medications   Medication Sig Dispense Refill     amLODIPine (NORVASC) 10 MG tablet Take 1 tablet (10 mg total) by mouth daily. 30 tablet 5     atorvastatin (LIPITOR) 10 MG tablet Take 1 tablet (10 mg total) by mouth daily. 90 tablet 1     blood glucose meter (GLUCOMETER) Use 1 each As  Directed as needed. Dispense glucometer brand per patient's insurance at pharmacy discretion. 1 each 0     blood glucose test (GLUCOSE BLOOD) strips Test blood sugars once daily. Dx code: E11.9 100 strip 5     generic lancets Test one time per day. Dispense brand per patient's insurance at pharmacy discretion. 100 each 11     lancing device Misc Use as directed to test blood sugars twice daily. Dispense brand per patient's insurance at pharmacy discretion. 1 each 0     losartan (COZAAR) 100 MG tablet Take 1 tablet (100 mg total) by mouth daily. 30 tablet 5     metFORMIN (GLUCOPHAGE) 500 MG tablet Take 2 tablets (1,000 mg total) by mouth 2 (two) times a day with meals. 120 tablet 5     naproxen (NAPROSYN) 500 MG tablet 1 po two times a day with meals as needed for pain 60 tablet 5     polyethylene glycol (GLYCOLAX) 17 gram/dose powder 17 gm in 8 oz water daily 510 g 6     sildenafil (REVATIO) 20 mg tablet 2-3 po 1 hour prior to sex as needed 30 tablet 3     VENTOLIN HFA 90 mcg/actuation inhaler Inhale 2 puffs every 4 (four) hours.       No current facility-administered medications for this visit.        ROS:   10 point review of systems positive as discussed above otherwise negative    Objective:    /58 (Patient Site: Right Arm, Patient Position: Sitting, Cuff Size: Adult Large)   Pulse 80   Resp 12   Wt 200 lb (90.7 kg)   BMI 34.33 kg/m    Body mass index is 34.33 kg/m .    General appearance tired    Vital signs were stable.    Lungs are clear throughout no rales or rhonchi heart was regular rate in the 80s    Neck negative    Lower extremities with some tenderness along the joint line he has a little discomfort posteriorly as well.  Please see previous discussion and x-rays showing a moderate degenerative change.    No calf redness warmth or swelling    He does have some discomfort in his lower back more on the left than the right.  Negative radicular findings, negative straight leg raising.    Skin was  otherwise normal no rashes    Lower extremities without numbness.    No focal weakness    A1c 8.2 which is improved from 10.5 last December    Lipid and CMP pending.    Results for orders placed or performed in visit on 08/21/19   Glycosylated Hemoglobin A1c   Result Value Ref Range    Hemoglobin A1c 8.2 (H) 3.5 - 6.0 %       Assessment:  1. Type 2 diabetes mellitus without complication, without long-term current use of insulin  atorvastatin (LIPITOR) 10 MG tablet    Comprehensive Metabolic Panel    Glycosylated Hemoglobin A1c    generic lancets    losartan (COZAAR) 100 MG tablet   2. ED (erectile dysfunction)  sildenafil (REVATIO) 20 mg tablet   3. Left-sided thoracic back pain, unspecified chronicity     4. HTN (hypertension)  Comprehensive Metabolic Panel    amLODIPine (NORVASC) 10 MG tablet    losartan (COZAAR) 100 MG tablet    DISCONTINUED: amLODIPine (NORVASC) 10 MG tablet    DISCONTINUED: lisinopril (PRINIVIL,ZESTRIL) 40 MG tablet    DISCONTINUED: lisinopril (PRINIVIL,ZESTRIL) 40 MG tablet   5. Abdominal pain     6. Primary osteoarthritis of right knee  Ambulatory referral to Orthopedic Surgery    naproxen (NAPROSYN) 500 MG tablet   7. Primary osteoarthritis of left knee  Ambulatory referral to Orthopedic Surgery    naproxen (NAPROSYN) 500 MG tablet   8. Hyperlipidemia, unspecified hyperlipidemia type  Lipid Cascade RANDOM   9. Diabetes mellitus (H)  metFORMIN (GLUCOPHAGE) 500 MG tablet    blood glucose meter (GLUCOMETER)    blood glucose test (GLUCOSE BLOOD) strips     Diabetes mellitus improved continue on metformin consider adding another agent for now we will have him work on diet and recheck in 3 months.    I did refill his sildenafil for erectile dysfunction and takes 2-3 as needed 1 hour prior to sex.    Hypertension, will stop lisinopril treat losartan 100 mg daily as well as amlodipine 100 mg a day    Osteoarthritis of the knees and low back pain use naproxen stop diclofenac, see orthopedist  regarding his knees    I also given a prescription for a meter and glucose test strips and lancets.  He wanted to start checking his blood sugars.        Plan: As outlined above plan to recheck in 3 months.    This transcription uses voice recognition software, which may contain typographical errors.

## 2021-05-31 NOTE — TELEPHONE ENCOUNTER
Relayed message to patient. Patient states understanding of plan. Patient has appointment on 8/5 @ 1.

## 2021-06-02 NOTE — TELEPHONE ENCOUNTER
I sent a prescription for lisinopril 40 mg 1 a day, that is what he was on previously    Tell him to monitor for any cough, sometimes the lisinopril can cause a cough.

## 2021-06-02 NOTE — TELEPHONE ENCOUNTER
Called patients room mate - they said wrong number. Left message on his friend Aba sanches at [phone] to return call. Okay to relay message to patient if patient returns call.

## 2021-06-02 NOTE — TELEPHONE ENCOUNTER
Refill Request  Did you contact pharmacy: No  Medication name: DICLOFENAC, NAPROXEN AND LISINOPRIL (Patient doesn't want to be on Losartan Potassium since it makes patient dizzy and doesn't make BP lower and would like to be back on Lisinopril)     Who prescribed the medication:   Pharmacy Name and Location: [place] [address] [place] AT [facility]   Is patient out of medication: Yes  Patient notified refills processed in 72 hours:  yes  Okay to leave a detailed message: YES

## 2021-06-03 NOTE — PROGRESS NOTES
Subjective: Patient comes in for follow-up on diabetes and meds.  He has hypertension hyperlipidemia diabetes mellitus.    Patient is also had some bronchospasm in the past.    His hips are bothering him he has had issues in the past regarding that.  Discussed options elected to use Celebrex.    Please see previous discussion A1c was 8.2 back in August LDL 66.    Patient was in the emergency room on 11/8/2019 labs and showed blood sugar 259 CBC normal other than hemoglobin 11.9.    Troponin negative chest x-ray unremarkable EKG had some nonspecific T wave changes no change from previous.    Patient denies any chest pain or shortness of breath now.    Meds were reviewed/reconciled.  Tobacco status: He  reports that he has been smoking cigarettes. He started smoking about 4 years ago. He has never used smokeless tobacco.    Patient Active Problem List    Diagnosis Date Noted     Microalbuminuria 10/10/2018     Hyperlipidemia 05/14/2015     HTN (hypertension) 05/14/2015     Diabetes mellitus (H) 05/14/2015     DJD (degenerative joint disease) of hip 05/14/2015     Osteoarthrosis, pelvic region and thigh 05/17/2013       Current Outpatient Medications   Medication Sig Dispense Refill     albuterol (VENTOLIN HFA) 90 mcg/actuation inhaler Inhale 2 puffs four times a day prn wheezing 1 Inhaler 3     amLODIPine (NORVASC) 10 MG tablet Take 1 tablet (10 mg total) by mouth daily. 90 tablet 1     atorvastatin (LIPITOR) 10 MG tablet Take 1 tablet (10 mg total) by mouth daily. 90 tablet 1     blood glucose meter (GLUCOMETER) Use 1 each As Directed as needed. Dispense glucometer brand per patient's insurance at pharmacy discretion. 1 each 0     blood glucose test (GLUCOSE BLOOD) strips Test blood sugars once daily. Dx code: E11.9 100 strip 5     generic lancets Test one time per day. Dispense brand per patient's insurance at pharmacy discretion. 100 each 11     lancing device Misc Use as directed to test blood sugars twice daily.  "Dispense brand per patient's insurance at pharmacy discretion. 1 each 0     lisinopril (PRINIVIL,ZESTRIL) 40 MG tablet Take 1 tablet (40 mg total) by mouth daily. 90 tablet 1     metFORMIN (GLUCOPHAGE) 500 MG tablet Take 2 tablets (1,000 mg total) by mouth 2 (two) times a day with meals. 360 tablet 1     polyethylene glycol (GLYCOLAX) 17 gram/dose powder 17 gm in 8 oz water daily 510 g 6     sildenafil (REVATIO) 20 mg tablet 2-3 po 1 hour prior to sex as needed 30 tablet 3     celecoxib (CELEBREX) 200 MG capsule 1 po two times a day prn pain 60 capsule 2     No current facility-administered medications for this visit.        ROS:   10 point review of systems positive as outlined above otherwise negative    Objective:    /58 (Patient Site: Left Arm, Patient Position: Sitting, Cuff Size: Adult Large)   Pulse 75   Resp 18   Ht 5' 4\" (1.626 m)   Wt 207 lb 8 oz (94.1 kg)   BMI 35.62 kg/m    Body mass index is 35.62 kg/m .      General appearance no acute distress    HEENT neck is supple oropharynx is clear pupils react normally extract movements full    Lungs are clear no rales or rhonchi heart was regular S1-S2 no chest wall pain.    Abdomen nontender no guarding or rebound    Some discomfort with rotation of the hip.  Otherwise minimal findings    Negative straight leg raising    Skin was normal    No significant edema.    A1c is down to 7.8.  He is on metformin 500 mg 2 tablets twice a day    Results for orders placed or performed in visit on 11/25/19   Glycosylated Hemoglobin A1c   Result Value Ref Range    Hemoglobin A1c 7.8 (H) 3.5 - 6.0 %       Assessment:  1. Type 2 diabetes mellitus without complication, without long-term current use of insulin  atorvastatin (LIPITOR) 10 MG tablet    Glycosylated Hemoglobin A1c   2. HTN (hypertension)  amLODIPine (NORVASC) 10 MG tablet   3. Diabetes mellitus (H)  metFORMIN (GLUCOPHAGE) 500 MG tablet   4. Essential hypertension  lisinopril (PRINIVIL,ZESTRIL) 40 MG " tablet   5. Bronchospasm  albuterol (VENTOLIN HFA) 90 mcg/actuation inhaler   6. Osteoarthritis of hip, unspecified laterality, unspecified osteoarthritis type  celecoxib (CELEBREX) 200 MG capsule     Diabetes mellitus controlled continue the same    Continue atorvastatin as well    Hypertension controlled with lisinopril and amlodipine    Bronchospasm refill on albuterol    Regarding his hip, we will try Celebrex he said normal renal function.    Recheck in 3 months    Plan: As outlined above    This transcription uses voice recognition software, which may contain typographical errors.

## 2021-06-03 NOTE — TELEPHONE ENCOUNTER
----- Message from Ethan Colunga MD sent at 12/1/2019  9:40 AM CST -----  Please contact this patient, let him know that the A1c was 7.8 which is better, stay on the same metformin as well as other medications.    Recheck in 3 months

## 2021-06-03 NOTE — TELEPHONE ENCOUNTER
Called [phone] Stated I had the wrong number. No other numer, No Communication consent form on file. Okay to send letter?

## 2021-06-03 NOTE — TELEPHONE ENCOUNTER
Called and spoke with Lalit Geiger , Message was given, Lalit Geiger  understood, no further questions.

## 2021-06-04 NOTE — TELEPHONE ENCOUNTER
RN cannot approve Refill Request    RN can NOT refill this medication med is not covered by policy/route to provider. Last office visit: 11/25/2019 Ethan Colunga MD Last Physical: 6/6/2017 Last MTM visit: Visit date not found Last visit same specialty: 11/25/2019 Ethan Colunga MD.  Next visit within 3 mo: Visit date not found  Next physical within 3 mo: Visit date not found      Mabel Horton, [facility] Triage/Med Refill 12/26/2019    Requested Prescriptions   Pending Prescriptions Disp Refills     celecoxib (CELEBREX) 200 MG capsule [Pharmacy Med Name: CELECOXIB 200 MG CAPSULE] 60 capsule 2     Sig: TAKE 1 CAPSULE BY MOUTH TWICE A DAY AS NEEDED FOR PAIN       There is no refill protocol information for this order

## 2021-06-06 NOTE — TELEPHONE ENCOUNTER
Refill Request  Did you contact pharmacy: No The patient states he is out of refills.   Medication name:   Requested Prescriptions     Pending Prescriptions Disp Refills     celecoxib (CELEBREX) 200 MG capsule 60 capsule 2     Sig: TAKE 1 CAPSULE BY MOUTH TWICE A DAY AS NEEDED FOR PAIN     atorvastatin (LIPITOR) 10 MG tablet 90 tablet 1     Sig: Take 1 tablet (10 mg total) by mouth daily.     amLODIPine (NORVASC) 10 MG tablet 90 tablet 1     Sig: Take 1 tablet (10 mg total) by mouth daily.     Who prescribed the medication: Ethan Colunga MD   [facility]  Is patient out of medication: Yes  Patient notified refills processed in 3 business days:  yes  Okay to leave a detailed message: yes

## 2021-06-06 NOTE — TELEPHONE ENCOUNTER
Refill Approved    Rx renewed per Medication Renewal Policy. Medication was last renewed on 11/25/19.    Molly Unger, [facility] Triage/Med Refill 3/16/2020     Requested Prescriptions   Pending Prescriptions Disp Refills     celecoxib (CELEBREX) 200 MG capsule 60 capsule 2     Sig: TAKE 1 CAPSULE BY MOUTH TWICE A DAY AS NEEDED FOR PAIN       There is no refill protocol information for this order        atorvastatin (LIPITOR) 10 MG tablet 90 tablet 1     Sig: Take 1 tablet (10 mg total) by mouth daily.       Statins Refill Protocol (Hmg CoA Reductase Inhibitors) Passed - 3/16/2020  4:58 PM        Passed - PCP or prescribing provider visit in past 12 months      Last office visit with prescriber/PCP: 11/25/2019 Ethan Colunag MD OR same dept: 11/25/2019 Ethan Colunga MD OR same specialty: 11/25/2019 Ethan Colunga MD  Last physical: 6/6/2017 Last MTM visit: Visit date not found   Next visit within 3 mo: Visit date not found  Next physical within 3 mo: Visit date not found  Prescriber OR PCP: Ethan Colunga MD  Last diagnosis associated with med order: 1. Osteoarthritis of hip, unspecified laterality, unspecified osteoarthritis type  - celecoxib (CELEBREX) 200 MG capsule; TAKE 1 CAPSULE BY MOUTH TWICE A DAY AS NEEDED FOR PAIN  Dispense: 60 capsule; Refill: 2    2. Type 2 diabetes mellitus without complication, without long-term current use of insulin  - atorvastatin (LIPITOR) 10 MG tablet; Take 1 tablet (10 mg total) by mouth daily.  Dispense: 90 tablet; Refill: 1    3. HTN (hypertension)  - amLODIPine (NORVASC) 10 MG tablet; Take 1 tablet (10 mg total) by mouth daily.  Dispense: 90 tablet; Refill: 1    If protocol passes may refill for 12 months if within 3 months of last provider visit (or a total of 15 months).                amLODIPine (NORVASC) 10 MG tablet 90 tablet 1     Sig: Take 1 tablet (10 mg total) by mouth daily.       Calcium-Channel Blockers Protocol Passed - 3/16/2020  4:58 PM         Passed - PCP or prescribing provider visit in past 12 months or next 3 months     Last office visit with prescriber/PCP: 11/25/2019 Ethan Colunga MD OR same dept: 11/25/2019 Ethan Colunga MD OR same specialty: 11/25/2019 Ethan Colunga MD  Last physical: 6/6/2017 Last MTM visit: Visit date not found   Next visit within 3 mo: Visit date not found  Next physical within 3 mo: Visit date not found  Prescriber OR PCP: Ethan Colunga MD  Last diagnosis associated with med order: 1. Osteoarthritis of hip, unspecified laterality, unspecified osteoarthritis type  - celecoxib (CELEBREX) 200 MG capsule; TAKE 1 CAPSULE BY MOUTH TWICE A DAY AS NEEDED FOR PAIN  Dispense: 60 capsule; Refill: 2    2. Type 2 diabetes mellitus without complication, without long-term current use of insulin  - atorvastatin (LIPITOR) 10 MG tablet; Take 1 tablet (10 mg total) by mouth daily.  Dispense: 90 tablet; Refill: 1    3. HTN (hypertension)  - amLODIPine (NORVASC) 10 MG tablet; Take 1 tablet (10 mg total) by mouth daily.  Dispense: 90 tablet; Refill: 1    If protocol passes may refill for 12 months if within 3 months of last provider visit (or a total of 15 months).             Passed - Blood pressure filed in past 12 months     BP Readings from Last 1 Encounters:   11/25/19 116/58

## 2021-06-06 NOTE — TELEPHONE ENCOUNTER
RN cannot approve Refill Request    RN can NOT refill this medication med is not covered by policy/route to provider.       Molly Unger, [facility] Triage/Med Refill 3/16/2020    Requested Prescriptions   Pending Prescriptions Disp Refills     celecoxib (CELEBREX) 200 MG capsule 60 capsule 2     Sig: TAKE 1 CAPSULE BY MOUTH TWICE A DAY AS NEEDED FOR PAIN       There is no refill protocol information for this order      Signed Prescriptions Disp Refills    atorvastatin (LIPITOR) 10 MG tablet 90 tablet 2     Sig: Take 1 tablet (10 mg total) by mouth daily.       Statins Refill Protocol (Hmg CoA Reductase Inhibitors) Passed - 3/16/2020  4:58 PM        Passed - PCP or prescribing provider visit in past 12 months      Last office visit with prescriber/PCP: 11/25/2019 Ethan Colunga MD OR same dept: 11/25/2019 Ethan Colunga MD OR same specialty: 11/25/2019 Ethan Colunga MD  Last physical: 6/6/2017 Last MTM visit: Visit date not found   Next visit within 3 mo: Visit date not found  Next physical within 3 mo: Visit date not found  Prescriber OR PCP: Ethan Colunga MD  Last diagnosis associated with med order: 1. Osteoarthritis of hip, unspecified laterality, unspecified osteoarthritis type  - celecoxib (CELEBREX) 200 MG capsule; TAKE 1 CAPSULE BY MOUTH TWICE A DAY AS NEEDED FOR PAIN  Dispense: 60 capsule; Refill: 2    2. Type 2 diabetes mellitus without complication, without long-term current use of insulin  - atorvastatin (LIPITOR) 10 MG tablet; Take 1 tablet (10 mg total) by mouth daily.  Dispense: 90 tablet; Refill: 2    3. HTN (hypertension)  - amLODIPine (NORVASC) 10 MG tablet; Take 1 tablet (10 mg total) by mouth daily.  Dispense: 90 tablet; Refill: 2    If protocol passes may refill for 12 months if within 3 months of last provider visit (or a total of 15 months).               amLODIPine (NORVASC) 10 MG tablet 90 tablet 2     Sig: Take 1 tablet (10 mg total) by mouth daily.       Calcium-Channel  Blockers Protocol Passed - 3/16/2020  4:58 PM        Passed - PCP or prescribing provider visit in past 12 months or next 3 months     Last office visit with prescriber/PCP: 11/25/2019 Ethan Colunga MD OR same dept: 11/25/2019 Ethan Colunga MD OR same specialty: 11/25/2019 Ethan Colunga MD  Last physical: 6/6/2017 Last MTM visit: Visit date not found   Next visit within 3 mo: Visit date not found  Next physical within 3 mo: Visit date not found  Prescriber OR PCP: Ethan Colunga MD  Last diagnosis associated with med order: 1. Osteoarthritis of hip, unspecified laterality, unspecified osteoarthritis type  - celecoxib (CELEBREX) 200 MG capsule; TAKE 1 CAPSULE BY MOUTH TWICE A DAY AS NEEDED FOR PAIN  Dispense: 60 capsule; Refill: 2    2. Type 2 diabetes mellitus without complication, without long-term current use of insulin  - atorvastatin (LIPITOR) 10 MG tablet; Take 1 tablet (10 mg total) by mouth daily.  Dispense: 90 tablet; Refill: 2    3. HTN (hypertension)  - amLODIPine (NORVASC) 10 MG tablet; Take 1 tablet (10 mg total) by mouth daily.  Dispense: 90 tablet; Refill: 2    If protocol passes may refill for 12 months if within 3 months of last provider visit (or a total of 15 months).             Passed - Blood pressure filed in past 12 months     BP Readings from Last 1 Encounters:   11/25/19 116/58

## 2021-06-07 NOTE — PROGRESS NOTES
[facility] Care Coordination Contact    Member became effective with [employer] on 5/1/20 with [employer].  Previous Health Plan: [employer]  Previous Care System: [employer]  Previous care coordinators name and number: Mauro Guaman [phone].  Waiver Type: EW  Last MMIS Entry: Date 1/9/20 and Type 02 Pers Assmt Act  MMIS visit date if different from above: N/A  Services Listed in MMIS: EW open.    UTF received: No: Requested on 5/4/20 from Liza (Mauro Guaman [phone]).    Jarrett Curtis  Care Management Specialist  [facility]  [phone]

## 2021-06-08 NOTE — TELEPHONE ENCOUNTER
"Attempted to call pt unable to reach due to number on file is out of service. Will try again.#1  \" Okay to schedule appointment upon return call\"    From: Vinny Bonner MD   Sent: 6/9/2020   1:11 PM CDT   To: [facility] Family Medicine/Ob Support Pool     Please set up Annual Wellness visit virtual visit.   "

## 2021-06-09 NOTE — TELEPHONE ENCOUNTER
Please contact this patient, let him know that he is overdue for a follow-up visit.    Schedule a virtual visit, video preferred, with me.    Let him know I gave him a 2-week supply of the Celebrex

## 2021-06-09 NOTE — TELEPHONE ENCOUNTER
Attempt #3. Call patient at  [phone] and a gentleman answered and stated that patient was not around. Asked if there was a better time to call patient, he stated he doesn't know as they are just room mates. No message was left. The phone number [phone] is the wrong number. Updated phone number. We have made several attempts to contact patient by phone and letter to schedule an appointment. Unfortunately, our calls have not been returned and we were unable to schedule. At this time, we will no longer make an attempt to schedule this appointment. Completing task.

## 2021-06-09 NOTE — TELEPHONE ENCOUNTER
Unable to LM at [phone]. Postponing task out to 2 weeks and will try again. Sending letter out.

## 2021-06-09 NOTE — TELEPHONE ENCOUNTER
Phone number listed is wrong number no Lalit living at that number [phone] sending out letter .  Post pone out  2  Weeks.

## 2021-06-10 NOTE — TELEPHONE ENCOUNTER
Left message #3 at [phone]. We have made several attempts to contact patient by phone and letter to schedule an appointment. Unfortunately, our calls have not been returned and we were unable to schedule. At this time, we will no longer make an attempt to schedule this appointment. Completing task.

## 2021-06-11 NOTE — PROGRESS NOTES
Subjective: This patient comes in for follow-up he is here for physical actually has not been in since September.  He has diabetes, hypertension, hyperlipidemia.  Also has the arthritis in through the right hip.    He is due for colonoscopy, and I gave him a referral for that.    Diabetes last checked September 2016 A1c was 6.6.  Patient states that his blood sugars are in the mid to higher 100s I did check A1c and microalbumin CMP lipid and PSA.    Patient refused rectal and prostate exam.    He continues on metformin also takes lisinopril and amlodipine for blood pressure.  He has not been taking his Lipitor 40 mg a day we will see her labs are at I did encourage him to refill that and start on that and I sent a prescription in.    Otherwise no additional concerns or issues    Tobacco status: He  reports that he has quit smoking. His smoking use included Cigarettes. He started smoking about 1 years ago. He has never used smokeless tobacco.    Patient Active Problem List    Diagnosis Date Noted     Hyperlipidemia 05/14/2015     HTN (hypertension) 05/14/2015     Diabetes mellitus 05/14/2015     DJD (degenerative joint disease) of hip 05/14/2015     Osteoarthrosis, pelvic region and thigh 05/17/2013       Current Outpatient Prescriptions   Medication Sig Dispense Refill     atorvastatin (LIPITOR) 40 MG tablet TAKE 1 TABLET (40 MG TOTAL) BY MOUTH BEDTIME. 90 tablet 1     blood glucose meter (GLUCOMETER) Use 1 each As Directed as needed. Dispense glucometer brand per patient's insurance at pharmacy discretion. 1 each 0     blood glucose test (GLUCOSE BLOOD) strips Test blood sugars twice daily. Dx code: E11.9 100 strip 5     generic lancets Test one time per day. Dispense brand per patient's insurance at pharmacy discretion. 100 each 11     LANCETS MISC Use As Directed daily.       lancing device Misc Use as directed to test blood sugars twice daily. Dispense brand per patient's insurance at pharmacy discretion. 1 each  "0     naproxen (NAPROSYN) 500 MG tablet Take 1 tablet (500 mg total) by mouth 2 (two) times a day with meals. 60 tablet 2     VENTOLIN HFA 90 mcg/actuation inhaler Inhale 2 puffs every 4 (four) hours.       amLODIPine (NORVASC) 10 MG tablet TAKE 1 TABLET (10 MG TOTAL) BY MOUTH DAILY. 90 tablet 1     lisinopril (PRINIVIL,ZESTRIL) 40 MG tablet Take 1 tablet (40 mg total) by mouth daily. 90 tablet 1     metFORMIN (GLUCOPHAGE) 500 MG tablet 1 po qd 90 tablet 1     No current facility-administered medications for this visit.        ROS:   10 point review of systems negative other than as outlined above    Objective:    /60 (Patient Site: Right Arm, Patient Position: Sitting, Cuff Size: Adult Large)  Pulse 72  Temp 98.2  F (36.8  C) (Oral)   Resp 16  Ht 5' 4\" (1.626 m)  Wt 191 lb (86.6 kg)  BMI 32.79 kg/m2  Body mass index is 32.79 kg/(m^2).      General appearance no acute distress    Pupils react normally extraocular movements are full he has recently seen the eye doctor.    Canals and TMs normal    Lungs are clear no rales or rhonchi, heart regular S1-S2 with a grade 1/6 murmur    Abdomen nontender no masses no axillary inguinal adenopathy    Testes and rectal exam not checked per patient request    Extremities without edema pulses normal sensation normal    He has pain with rotation both internal and external in through the right hip.    Skin was normal    Labs A1c 6.5 hemoglobin 14.1 white count 6600 platelets 397,000    Additionally CMP microalbumin lipid PSA pending.    Results for orders placed or performed in visit on 06/06/17   2(CBC w/o Differential)   Result Value Ref Range    WBC 6.6 4.0 - 11.0 thou/uL    RBC 5.28 4.40 - 6.20 mill/uL    Hemoglobin 14.1 14.0 - 18.0 g/dL    Hematocrit 44.3 40.0 - 54.0 %    MCV 84 80 - 100 fL    MCH 26.8 (L) 27.0 - 34.0 pg    MCHC 31.9 (L) 32.0 - 36.0 g/dL    RDW 18.1 (H) 11.0 - 14.5 %    Platelets 397 140 - 440 thou/uL    MPV 6.7 (L) 7.0 - 10.0 fL   Glycosylated " Hemoglobin A1c   Result Value Ref Range    Hemoglobin A1c 6.5 (H) 3.5 - 6.0 %       Assessment:  1. Routine general medical examination at a health care facility  Ambulatory referral for Colonoscopy    PSA (Prostatic-Specific Antigen), Diagnostic   2. Diabetes mellitus  metFORMIN (GLUCOPHAGE) 500 MG tablet    Microalbumin, Random Urine    HM2(CBC w/o Differential)    Glycosylated Hemoglobin A1c   3. HTN (hypertension)  lisinopril (PRINIVIL,ZESTRIL) 40 MG tablet    amLODIPine (NORVASC) 10 MG tablet    Comprehensive Metabolic Panel   4. Hyperlipidemia  atorvastatin (LIPITOR) 40 MG tablet    Lipid Cascade FASTING   5. Degenerative joint disease (DJD) of hip      physical exam stable      Diabetes mellitus continues to be controlled on metformin    Hyperlipidemia he has been off Lipitor will await results I did give him a prescription for the 40 mg to restart 1 a day    Hypertension controlled on lisinopril and amlodipine microalbumin pending.    Degenerative joint disease right hip as before, patient uses a cane    Plan: Patient be contacted with results I did refill his meds today    Healthcare maintenance patient refused the exam regarding rectal prostate also was a little reluctant on colonoscopy but said he would let us go ahead and give the information to [facility]    This transcription uses voice recognition software, which may contain typographical errors.

## 2021-06-11 NOTE — TELEPHONE ENCOUNTER
RN cannot approve Refill Request    RN can NOT refill this medication PCP messaged that patient is overdue for Labs. Last office visit: 11/25/2019 Ethan Colunga MD Last Physical: 6/6/2017 Last MTM visit: Visit date not found Last visit same specialty: 11/25/2019 Ethan Colunga MD.  Next visit within 3 mo: Visit date not found  Next physical within 3 mo: Visit date not found      Divya Urias, [facility] Triage/Med Refill 9/7/2020    Requested Prescriptions   Pending Prescriptions Disp Refills     lisinopriL (PRINIVIL,ZESTRIL) 40 MG tablet [Pharmacy Med Name: LISINOPRIL 40 MG TABLET] 90 tablet 0     Sig: TAKE 1 TABLET BY MOUTH EVERY DAY       Ace Inhibitors Refill Protocol Failed - 9/6/2020 10:07 AM        Failed - Serum Potassium in past 12 months     No results found for: LN-POTASSIUM          Failed - Serum Creatinine in past 12 months     Creatinine   Date Value Ref Range Status   08/21/2019 1.06 0.70 - 1.30 mg/dL Final             Passed - PCP or prescribing provider visit in past 12 months       Last office visit with prescriber/PCP: 11/25/2019 Ethan Colunga MD OR same dept: 11/25/2019 Ethan Colunga MD OR same specialty: 11/25/2019 Ethan Colunga MD  Last physical: 6/6/2017 Last MTM visit: Visit date not found   Next visit within 3 mo: Visit date not found  Next physical within 3 mo: Visit date not found  Prescriber OR PCP: Ethan Colunga MD  Last diagnosis associated with med order: 1. Essential hypertension  - lisinopriL (PRINIVIL,ZESTRIL) 40 MG tablet [Pharmacy Med Name: LISINOPRIL 40 MG TABLET]; TAKE 1 TABLET BY MOUTH EVERY DAY  Dispense: 90 tablet; Refill: 0    2. Diabetes mellitus (H)  - metFORMIN (GLUCOPHAGE) 500 MG tablet [Pharmacy Med Name: METFORMIN  MG TABLET]; TAKE 2 TABLETS BY MOUTH TWICE A DAY WITH FOOD  Dispense: 360 tablet; Refill: 0    If protocol passes may refill for 12 months if within 3 months of last provider visit (or a total of 15 months).              Passed - Blood pressure filed in past 12 months     BP Readings from Last 1 Encounters:   11/25/19 116/58                metFORMIN (GLUCOPHAGE) 500 MG tablet [Pharmacy Med Name: METFORMIN  MG TABLET] 360 tablet 0     Sig: TAKE 2 TABLETS BY MOUTH TWICE A DAY WITH FOOD       Metformin Refill Protocol Failed - 9/6/2020 10:07 AM        Failed - LFT or AST or ALT in last 12 months     Albumin   Date Value Ref Range Status   08/21/2019 3.9 3.5 - 5.0 g/dL Final     Bilirubin, Total   Date Value Ref Range Status   08/21/2019 0.4 0.0 - 1.0 mg/dL Final     Bilirubin, Direct   Date Value Ref Range Status   06/22/2016 0.2 <=0.5 mg/dL Final     Alkaline Phosphatase   Date Value Ref Range Status   08/21/2019 88 45 - 120 U/L Final     AST   Date Value Ref Range Status   08/21/2019 23 0 - 40 U/L Final     ALT   Date Value Ref Range Status   08/21/2019 31 0 - 45 U/L Final     Protein, Total   Date Value Ref Range Status   08/21/2019 7.6 6.0 - 8.0 g/dL Final                Failed - GFR or Serum Creatinine in last 6 months     GFR MDRD Non Af Amer   Date Value Ref Range Status   08/21/2019 >60 >60 mL/min/1.73m2 Final     GFR MDRD Af Amer   Date Value Ref Range Status   08/21/2019 >60 >60 mL/min/1.73m2 Final             Failed - Visit with PCP or prescribing provider visit in last 6 months or next 3 months     Last office visit with prescriber/PCP: Visit date not found OR same dept: 11/25/2019 Ethan Colunga MD OR same specialty: 11/25/2019 Ethan Colunga MD Last physical: Visit date not found Last MTM visit: Visit date not found         Next appt within 3 mo: Visit date not found  Next physical within 3 mo: Visit date not found  Prescriber OR PCP: Ethan Colunga MD  Last diagnosis associated with med order: 1. Essential hypertension  - lisinopriL (PRINIVIL,ZESTRIL) 40 MG tablet [Pharmacy Med Name: LISINOPRIL 40 MG TABLET]; TAKE 1 TABLET BY MOUTH EVERY DAY  Dispense: 90 tablet; Refill: 0    2. Diabetes mellitus (H)  -  metFORMIN (GLUCOPHAGE) 500 MG tablet [Pharmacy Med Name: METFORMIN  MG TABLET]; TAKE 2 TABLETS BY MOUTH TWICE A DAY WITH FOOD  Dispense: 360 tablet; Refill: 0     If protocol passes may refill for 12 months if within 3 months of last provider visit (or a total of 15 months).           Failed - A1C in last 6 months     Hemoglobin A1c   Date Value Ref Range Status   11/25/2019 7.8 (H) 3.5 - 6.0 % Final               Failed - Microalbumin in last year      Microalbumin, Random Urine   Date Value Ref Range Status   07/31/2018 2.01 (H) 0.00 - 1.99 mg/dL Final                  Passed - Blood pressure in last 12 months     BP Readings from Last 1 Encounters:   11/25/19 116/58

## 2021-06-11 NOTE — TELEPHONE ENCOUNTER
Please contact this patient he is due for follow-up virtual visit with Dr. Colunga.  Please schedule a video visit if possible    30-day supply of medication only was given

## 2021-06-12 NOTE — TELEPHONE ENCOUNTER
Please contact this patient, let him know he is due for follow-up virtual visit with Dr. Colunga please schedule this, video preferred    Let him know he got 2-week supply of medication only

## 2021-06-12 NOTE — TELEPHONE ENCOUNTER
Medication Question or Clarification  Who is calling: The patient   What medication are you calling about (include dose and sig)?: lisinopriL (PRINIVIL,ZESTRIL) 40 MG tablet  Who prescribed the medication?: Ethan Colunga MD   What is your question/concern?: The patient is requesting a refill. This writer informed the patient that he is due for a visit per Ethan Colunga MD note on 10/12/20 which th patient states he did not receive a message due to changing his phone #. The patient would like enough medication until he is seen.  Requested Pharmacy: [facility]  Okay to leave a detailed message?: Yes

## 2021-06-12 NOTE — TELEPHONE ENCOUNTER
DOD,  Patient has an appointment 11/9/2020.     Are you willing to send a small qty until appointment

## 2021-06-12 NOTE — TELEPHONE ENCOUNTER
Unable to LM at [phone] due to the gal who answered phone stated that patient no longer lives there and hung up. Sending letter out and postponing task out to 2 weeks and will try again if an appointment hasn't been made.

## 2021-06-12 NOTE — TELEPHONE ENCOUNTER
Patient needs a follow-up virtual visit to discuss this, and recheck on his diabetes.  He is long overdue

## 2021-06-12 NOTE — PROGRESS NOTES
"Lalit Geiger is a 65 y.o. male who is being evaluated via a billable video visit.      The patient has been notified of following:     \"This video visit will be conducted via a call between you and your physician/provider. We have found that certain health care needs can be provided without the need for an in-person physical exam.  This service lets us provide the care you need with a video conversation.  If a prescription is necessary we can send it directly to your pharmacy.  If lab work is needed we can place an order for that and you can then stop by our lab to have the test done at a later time.    Video visits are billed at different rates depending on your insurance coverage. Please reach out to your insurance provider with any questions.    If during the course of the call the physician/provider feels a video visit is not appropriate, you will not be charged for this service.\"    Patient has given verbal consent to a Video visit? Yes  How would you like to obtain your AVS? AVS Preference: Mail a copy.  If dropped by the video visit, the video invitation should be sent to: Text to cell phone: [phone]   Will anyone else be joining your video visit? No        Video Start Time: 2:59 pm    Additional provider notes:    Subjective: This patient had a virtual visit, video, due to the coronavirus pandemic.    Patient has not been in for a while last A1c was 7.8 back in November    He does have normal renal function and liver function    In August LDL was 68    He needs to come in for labs including CMP A1c microalbumin and lipid.    Referral to [facility] for [facility] check    He denies any foot problems denies any numbness or atrophic changes.    His meds were refilled amlodipine 10 mg 1 a day lisinopril 40 mg 1 a day atorvastatin 10 mg 1 a day metformin 500 mg 2 twice a day.    Blood sugars have been in the low to mid 100s he states.    He has not been as active as he should be he does have " morbid obesity please see previous discussion I want him to try to increase that    No COVID-19 symptoms or exposure.    No additional concerns or issues other than his ongoing joint pain he has osteoarthritis.  He does take Celebrex 200 mg twice a day with food.  I told him he could add some Tylenol 500 mg 1-2 3 times daily as needed as well.        Tobacco status: He  reports that he has been smoking cigarettes. He started smoking about 5 years ago. He has never used smokeless tobacco.    Patient Active Problem List    Diagnosis Date Noted     Obesity (BMI 35.0-39.9) with comorbidity (H) 11/09/2020     Microalbuminuria 10/10/2018     Hyperlipidemia 05/14/2015     HTN (hypertension) 05/14/2015     Diabetes mellitus (H) 05/14/2015     DJD (degenerative joint disease) of hip 05/14/2015     Osteoarthrosis, pelvic region and thigh 05/17/2013       Current Outpatient Medications   Medication Sig Dispense Refill     albuterol (VENTOLIN HFA) 90 mcg/actuation inhaler Inhale 2 puffs four times a day prn wheezing 1 Inhaler 2     amLODIPine (NORVASC) 10 MG tablet Take 1 tablet (10 mg total) by mouth daily. 30 tablet 2     aspirin 81 MG EC tablet Take 81 mg by mouth daily.       atorvastatin (LIPITOR) 10 MG tablet Take 1 tablet (10 mg total) by mouth daily. 30 tablet 2     blood glucose meter (GLUCOMETER) Use 1 each As Directed as needed. Dispense glucometer brand per patient's insurance at pharmacy discretion. 1 each 0     blood glucose test (GLUCOSE BLOOD) strips Test blood sugars once daily. Dx code: E11.9 100 strip 5     celecoxib (CELEBREX) 200 MG capsule 1 po two times a day as needed for pain 60 capsule 2     generic lancets Test one time per day. Dispense brand per patient's insurance at pharmacy discretion. 100 each 11     lancing device Misc Use as directed to test blood sugars twice daily. Dispense brand per patient's insurance at pharmacy discretion. 1 each 0     lisinopriL (PRINIVIL,ZESTRIL) 40 MG tablet Take 1  tablet (40 mg total) by mouth daily. 30 tablet 2     metFORMIN (GLUCOPHAGE) 500 MG tablet TAKE 2 TABLETS BY MOUTH TWICE A DAY WITH FOOD 120 tablet 2     acetaminophen (TYLENOL EXTRA STRENGTH) 500 MG tablet 1-2 po tid prn pain 80 tablet 2     polyethylene glycol (GLYCOLAX) 17 gram/dose powder 17 gm in 8 oz water daily 510 g 6     sildenafil (REVATIO) 20 mg tablet 2-3 po 1 hour prior to sex as needed 30 tablet 3     No current facility-administered medications for this visit.        ROS:   10 point review of systems negative other than as outlined above.    Denies any COVID-19 exposures or symptoms.    Objective:    There were no vitals taken for this visit.  There is no height or weight on file to calculate BMI.      General: No acute distress    HEENT denies any nasal congestion no sore throat no adenopathy his neck is supple    Lungs: Nonlabored breathing no wheezing he does use albuterol inhaler occasionally he needed a refill on that as well    Heart: No palpitations or rapid heart rate    Abdomen soft no tenderness he denies any swelling    Does have some ongoing pain in through the hip.  It does limit his activity.    Lower extremities without edema    Skin is normal no rashes no atrophic changes    Denies any tingling or numbness in his feet.    Follow-up labs as outlined: CMP A1c microalbumin and lipid    Results for orders placed or performed in visit on 11/25/19   Glycosylated Hemoglobin A1c   Result Value Ref Range    Hemoglobin A1c 7.8 (H) 3.5 - 6.0 %       Assessment:  1. Type 2 diabetes mellitus with microalbuminuria, without long-term current use of insulin (H)  Microalbumin, Random Urine    Glycosylated Hemoglobin A1c    Comprehensive Metabolic Panel    Ambulatory referral to Ophthalmology   2. Essential hypertension  lisinopriL (PRINIVIL,ZESTRIL) 40 MG tablet    Comprehensive Metabolic Panel   3. Morbid obesity (H)     4. Hyperlipidemia, unspecified hyperlipidemia type  Comprehensive Metabolic Panel     Lipid Cascade FASTING   5. Microalbuminuria     6. Diabetes mellitus (H)  metFORMIN (GLUCOPHAGE) 500 MG tablet   7. Type 2 diabetes mellitus without complication, without long-term current use of insulin  atorvastatin (LIPITOR) 10 MG tablet   8. HTN (hypertension)  amLODIPine (NORVASC) 10 MG tablet   9. Colon cancer screening  Cologuard   10. Osteoarthritis of hip, unspecified laterality, unspecified osteoarthritis type  celecoxib (CELEBREX) 200 MG capsule    acetaminophen (TYLENOL EXTRA STRENGTH) 500 MG tablet   11. Bronchospasm  albuterol (VENTOLIN HFA) 90 mcg/actuation inhaler     Patient be contacted regarding results of the blood work plan to see him back for a follow-up virtual visit in 3 months.    Refill on medications.    Continue to try to work on diet and exercise    Use Tylenol and Celebrex for the hip pain.    Check blood pressure when patient comes in.    Check lipids, he is on atorvastatin 10 mg daily.    Check A1c, sounds like his diabetes is under control    Plan: As outlined above.  He will be contacted with results follow-up in 3 months    This transcription uses voice recognition software, which may contain typographical errors.     Video-Visit Details    Type of service:  Video Visit    Video End Time (time video stopped): 3:14 PM  Originating Location (pt. Location): Home    Distant Location (provider location):  [facility]     Platform used for Video Visit: Rome Colunga MD

## 2021-06-12 NOTE — TELEPHONE ENCOUNTER
Drug Change Request  Who is calling?:  Pharmacy fax  What is the current medication?:  albuterol (VENTOLIN HFA) 90 mcg/actuation inhaler  What alternative is being requested?: ProAir or alternative  Why the request to change?:  Ventolin is not covered by insurance.  Requested Pharmacy?: [facility]  Okay to leave a detailed message?:  Yes

## 2021-06-12 NOTE — TELEPHONE ENCOUNTER
Called [phone] and gentleman that answered phone stated that Lalit no longer lives there and he did not have a alternate number to contact patient at. No detail message left, ask him to relay message to patient to call clinic. Postponing out 1 week and will try again.

## 2021-06-12 NOTE — TELEPHONE ENCOUNTER
Try calling the number [phone], but got a busy signal. Called and left message #1 at [phone]. Postponing task out to a week and will try again.

## 2021-06-12 NOTE — TELEPHONE ENCOUNTER
Maria Del Carmenp #3-Called patient at [phone], and gentleman that answered stated that patient moved and has not been back there.  We have made several attempts to contact patient by phone and letter to schedule an appointment. Unfortunately, our calls have not been returned and we were unable to schedule. At this time, we will no longer make an attempt to schedule this appointment. Completing task.

## 2021-06-13 NOTE — PROGRESS NOTES
[facility] Care Coordination Contact    [employer]:  Faxed completed PCA assessment to [facility] Agency and mailed copies to member.  Faxed MD Communication to PCP.  Emailed referral form for auth to [employer].

## 2021-06-13 NOTE — PROGRESS NOTES
[facility] Care Coordination Contact    Completed following tasks:    Updated services in access and Submitted referrals/auths for 2 Diabetic socks of $30.00 with [employer].     Jarrett Curtis  Care Management Specialist  [facility]  [phone]

## 2021-06-13 NOTE — PROGRESS NOTES
From [employer]:    The pass issued to Lalit, # [phone], will be reloaded every month for $120 every month until December 2021.    Thank you and enjoy your day,    Dafne Rivera  Provider Oversight Coordinator  [email or web address]    .................................................................................................      [facility] Care Coordination Contact    Received after visit chart from care coordinator.  Completed following tasks: Mailed copy of care plan to client     Updated services in access and Submitted referrals/auths for  HMK 3 hrs/wk from 1/1/21-12/31/21 with [employer].      Provider Signature - No POC Shared:  Member indicates that they do not want their POC shared with any EW providers.    Order placed with [facility] (p: [phone]; f: [phone]) for Single prong Cane and Diabetic socks.  Order placed on 11/27/20. [facility] will send quote.    Mailed: POC, PCA signature pages, and ABRAM form to member included a stamped return envelope.    Requested [employer] bus card unlimited monthly of $120.00 EW pays for community ride effective 1/1/21-12/31/21.     Mail Honoring Choices letter in EPIC (ACP Resources or ACP Review of Record)  - [facility] Standard & Goals Worksheet  - Educational Materials: general guide, health care agent, & terms  - [facility] Serious Illness & Goals Worksheet  - Class Information    Jarrett Curtis  Care Management Specialist  [facility]  [phone]

## 2021-06-13 NOTE — PROGRESS NOTES
[facility] Care Coordination Contact    PCA auth confirmation from [employer], Auth #[id number] date span from 1/1/21-12/31/21, and [facility] auth #[id number].      Jarrett Curtis  Care Management Specialist  [facility]  [phone]

## 2021-06-13 NOTE — PROGRESS NOTES
[facility] Care Coordination Contact  [facility] Home Visit Assessment     Home visit for Health Risk Assessment with Lalit Geiger completed on 11/18/2020    Type of residence:: Apartment(duplex)  Current living arrangement:: I live alone(lives with a roommate)     Assessment completed with: Patient    Current Care Plan  Member currently receiving the following home care services:     Member currently receiving the following community resources: Lif[employer], PCA, Housekeeping/Chore Agency    Medication Review  Medication reconciliation completed in Epic: YES  Medication set-up & administration: Independent-does not set up  Self-administers medications  Medication Risk Assessment Medication (1 or more, place referral to MTM):  N/A: No risk factors identified  MTM Referral Placed: No: No risk factors idenified    Mental/Behavioral Health   Depression Screening:    PHQ-2 Total Score: 1       Mental health DX:: No        Falls Assessment:   Fallen 2 or more times in the past year?: No   Any fall with injury in the past year?: No    ADL/IADL Dependencies:   Dependent ADLs:: Bathing, Dressing  Dependent IADLs:: Cleaning, Cooking, Laundry, Shopping, Meal Preparation, Transportation    [facility] sponsored benefits: Shared information re: Silver Sneakers/gym memberships, ASA, Calcium +D.    PCA Assessment completed at visit: Yes Annual PCA assessment indicated 5 units per day of PCA. This is the same as the previous assessment.     Elderly Waiver Eligibility: Yes - will continue on EW    Care Plan & Recommendations: Annual Health Risk Assessment/Elderly Waiver reassessment visit completed via phone with Sofia Cohn reports he has recently moved and now lives in an apartment with a roommate (an older woman).  He reports it is going well and she helps with cooking often.  Lalit continues to receive PCA, HM, and Lifeline and reports all services are going well.  Lalit did request an unlimited funds bus card as  he reports he uses metro transit all the time.  Care Coordinator will look into this.  Lalit also requested a walking cane as he reports he currently walks independently but a cane would be nice for stability.  Lalit also requested diabetic socks.  Care Coordinator stated he will need to see his PCP first to discuss these items and then once that visit is done Care Coordinator can go forward with placing the orders if approved by MD.  Lalit will follow up with Care Coordinator after he has a visit with his PCP.  Lalit reports no falls in the past year.  He reports one ER visit due to shortness of breath and was worried he had COVID-19.  He tested negative.  Lalit had no hospital stays.  Lalit would like to continue to receive PCA, HM and lifeline services as well.  No further questions, concerns or needs at this time.      See [facility] for detailed assessment information.    Follow-Up Plan: Member informed of future contact, plan to f/u with member with a 6 month telephone assessment.  Contact information shared with member and family, encouraged member to call with any questions or concerns at any time.    [place] care continuum providers: Please refer to Health Care Home on the Epic Problem List to view this patient's [place] [employer] Care Plan Summary.    Maria Esther Singh, TIEN  [facility]  [phone]

## 2021-06-13 NOTE — PROGRESS NOTES
[facility] Care Coordination Contact    Called member to schedule annual HRA home visit. HRA has been scheduled for Wednesday, November 18 at 2:00pm.     TIEN Saldivar  [facility]  [phone]

## 2021-06-14 NOTE — PROGRESS NOTES
DME (diabetic socks and single prone cane) shipped to member per [facility].    Jarrett Curtis  Care Management Specialist  [facility]  [phone]

## 2021-06-14 NOTE — TELEPHONE ENCOUNTER
RN cannot approve Refill Request    RN can NOT refill this medication PCP messaged that patient is overdue for Labs. Last office visit: 11/25/2019 Ethan Colunga MD Last Physical: 6/6/2017 Last MTM visit: Visit date not found Last visit same specialty: 11/25/2019 Ethan Colunga MD.  Next visit within 3 mo: Visit date not found  Next physical within 3 mo: Visit date not found      Dafne Spence, Care Connection Triage/Med Refill 2/1/2021    Requested Prescriptions   Pending Prescriptions Disp Refills     metFORMIN (GLUCOPHAGE) 500 MG tablet [Pharmacy Med Name: METFORMIN  MG TABLET] 360 tablet 0     Sig: TAKE 2 TABLETS BY MOUTH TWICE A DAY WITH FOOD       Metformin Refill Protocol Failed - 2/1/2021 12:38 AM        Failed - Blood pressure in last 12 months     BP Readings from Last 1 Encounters:   11/25/19 116/58             Failed - LFT or AST or ALT in last 12 months     Albumin   Date Value Ref Range Status   08/21/2019 3.9 3.5 - 5.0 g/dL Final     Bilirubin, Total   Date Value Ref Range Status   08/21/2019 0.4 0.0 - 1.0 mg/dL Final     Bilirubin, Direct   Date Value Ref Range Status   06/22/2016 0.2 <=0.5 mg/dL Final     Alkaline Phosphatase   Date Value Ref Range Status   08/21/2019 88 45 - 120 U/L Final     AST   Date Value Ref Range Status   08/21/2019 23 0 - 40 U/L Final     ALT   Date Value Ref Range Status   08/21/2019 31 0 - 45 U/L Final     Protein, Total   Date Value Ref Range Status   08/21/2019 7.6 6.0 - 8.0 g/dL Final                Failed - GFR or Serum Creatinine in last 6 months     GFR MDRD Non Af Amer   Date Value Ref Range Status   08/21/2019 >60 >60 mL/min/1.73m2 Final     GFR MDRD Af Amer   Date Value Ref Range Status   08/21/2019 >60 >60 mL/min/1.73m2 Final             Failed - A1C in last 6 months     Hemoglobin A1c   Date Value Ref Range Status   11/25/2019 7.8 (H) 3.5 - 6.0 % Final               Failed - Microalbumin in last year      Microalbumin, Random Urine   Date  Value Ref Range Status   07/31/2018 2.01 (H) 0.00 - 1.99 mg/dL Final                  Passed - Visit with PCP or prescribing provider visit in last 6 months or next 3 months     Last office visit with prescriber/PCP: Visit date not found OR same dept: Visit date not found OR same specialty: 11/25/2019 Ethan Colunga MD Last physical: Visit date not found Last MTM visit: Visit date not found         Next appt within 3 mo: Visit date not found  Next physical within 3 mo: Visit date not found  Prescriber OR PCP: Ethan Colunga MD  Last diagnosis associated with med order: 1. Diabetes mellitus (H)  - metFORMIN (GLUCOPHAGE) 500 MG tablet [Pharmacy Med Name: METFORMIN  MG TABLET]; TAKE 2 TABLETS BY MOUTH TWICE A DAY WITH FOOD  Dispense: 360 tablet; Refill: 0    2. Essential hypertension  - lisinopriL (PRINIVIL,ZESTRIL) 40 MG tablet [Pharmacy Med Name: LISINOPRIL 40 MG TABLET]; TAKE 1 TABLET BY MOUTH EVERY DAY  Dispense: 90 tablet; Refill: 0     If protocol passes may refill for 12 months if within 3 months of last provider visit (or a total of 15 months).              lisinopriL (PRINIVIL,ZESTRIL) 40 MG tablet [Pharmacy Med Name: LISINOPRIL 40 MG TABLET] 90 tablet 0     Sig: TAKE 1 TABLET BY MOUTH EVERY DAY       [employer] Inhibitors Refill Protocol Failed - 2/1/2021 12:38 AM        Failed - Serum Potassium in past 12 months     No results found for: LN-POTASSIUM          Failed - Blood pressure filed in past 12 months     BP Readings from Last 1 Encounters:   11/25/19 116/58             Failed - Serum Creatinine in past 12 months     Creatinine   Date Value Ref Range Status   08/21/2019 1.06 0.70 - 1.30 mg/dL Final             Passed - PCP or prescribing provider visit in past 12 months       Last office visit with prescriber/PCP: 11/25/2019 Ethan Colunga MD OR same dept: Visit date not found OR same specialty: 11/25/2019 Ethan Colunga MD  Last physical: 6/6/2017 Last MTM visit: Visit date not found   Next  visit within 3 mo: Visit date not found  Next physical within 3 mo: Visit date not found  Prescriber OR PCP: Ethan Colunga MD  Last diagnosis associated with med order: 1. Diabetes mellitus (H)  - metFORMIN (GLUCOPHAGE) 500 MG tablet [Pharmacy Med Name: METFORMIN  MG TABLET]; TAKE 2 TABLETS BY MOUTH TWICE A DAY WITH FOOD  Dispense: 360 tablet; Refill: 0    2. Essential hypertension  - lisinopriL (PRINIVIL,ZESTRIL) 40 MG tablet [Pharmacy Med Name: LISINOPRIL 40 MG TABLET]; TAKE 1 TABLET BY MOUTH EVERY DAY  Dispense: 90 tablet; Refill: 0    If protocol passes may refill for 12 months if within 3 months of last provider visit (or a total of 15 months).

## 2021-06-15 NOTE — TELEPHONE ENCOUNTER
RN cannot approve Refill Request    RN can NOT refill this medication Protocol failed and NO refill given and please clarify SIG for missing elements. Last office visit: 11/25/2019 Ethan Colunga MD Last Physical: 6/6/2017 Last MTM visit: Visit date not found Last visit same specialty: 11/25/2019 Ethan Colunga MD.  Next visit within 3 mo: Visit date not found  Next physical within 3 mo: Visit date not found      Milan Sandoval, Care Connection Triage/Med Refill 2/19/2021    Requested Prescriptions   Pending Prescriptions Disp Refills     metFORMIN (GLUCOPHAGE) 500 MG tablet 120 tablet 0       Metformin Refill Protocol Failed - 2/18/2021  2:42 PM        Failed - Blood pressure in last 12 months     BP Readings from Last 1 Encounters:   11/25/19 116/58             Failed - LFT or AST or ALT in last 12 months     Albumin   Date Value Ref Range Status   08/21/2019 3.9 3.5 - 5.0 g/dL Final     Bilirubin, Total   Date Value Ref Range Status   08/21/2019 0.4 0.0 - 1.0 mg/dL Final     Bilirubin, Direct   Date Value Ref Range Status   06/22/2016 0.2 <=0.5 mg/dL Final     Alkaline Phosphatase   Date Value Ref Range Status   08/21/2019 88 45 - 120 U/L Final     AST   Date Value Ref Range Status   08/21/2019 23 0 - 40 U/L Final     ALT   Date Value Ref Range Status   08/21/2019 31 0 - 45 U/L Final     Protein, Total   Date Value Ref Range Status   08/21/2019 7.6 6.0 - 8.0 g/dL Final                Failed - GFR or Serum Creatinine in last 6 months     GFR MDRD Non Af Amer   Date Value Ref Range Status   08/21/2019 >60 >60 mL/min/1.73m2 Final     GFR MDRD Af Amer   Date Value Ref Range Status   08/21/2019 >60 >60 mL/min/1.73m2 Final             Failed - A1C in last 6 months     Hemoglobin A1c   Date Value Ref Range Status   11/25/2019 7.8 (H) 3.5 - 6.0 % Final               Failed - Microalbumin in last year      Microalbumin, Random Urine   Date Value Ref Range Status   07/31/2018 2.01 (H) 0.00 - 1.99 mg/dL Final                   Passed - Visit with PCP or prescribing provider visit in last 6 months or next 3 months     Last office visit with prescriber/PCP: Visit date not found OR same dept: Visit date not found OR same specialty: 11/25/2019 Ethan Colunga MD Last physical: Visit date not found Last MTM visit: Visit date not found         Next appt within 3 mo: Visit date not found  Next physical within 3 mo: Visit date not found  Prescriber OR PCP: Ethan Colunga MD  Last diagnosis associated with med order: 1. Diabetes mellitus (H)  - metFORMIN (GLUCOPHAGE) 500 MG tablet  Dispense: 120 tablet; Refill: 0     If protocol passes may refill for 12 months if within 3 months of last provider visit (or a total of 15 months).

## 2021-06-15 NOTE — PROGRESS NOTES
Lalit Geiger is a 65 y.o. male who is being evaluated via a billable video visit.      How would you like to obtain your AVS? Mail a copy.    Will anyone else be joining your video visit? No      Video Start Time: 3:05 pm    Assessment & Plan     Diagnoses and all orders for this visit:    Type 2 diabetes mellitus with microalbuminuria, without long-term current use of insulin (H)  -     Comprehensive Metabolic Panel; Future  -     Glycosylated Hemoglobin A1c; Future  -     aspirin 81 MG EC tablet; Take 1 tablet (81 mg total) by mouth daily.    Morbid obesity (H)    Essential hypertension  -     Comprehensive Metabolic Panel; Future  -     lisinopriL (PRINIVIL,ZESTRIL) 40 MG tablet; Take 1 tablet (40 mg total) by mouth daily.    Hyperlipidemia, unspecified hyperlipidemia type  -     Lipid Cascade FASTING; Future  -     Comprehensive Metabolic Panel; Future    Microalbuminuria  -     Microalbumin, Random Urine; Future    Colon cancer screening  -     Cologuard; Future    HTN (hypertension)  -     amLODIPine (NORVASC) 10 MG tablet; Take 1 tablet (10 mg total) by mouth daily.    Type 2 diabetes mellitus without complication, without long-term current use of insulin  -     atorvastatin (LIPITOR) 10 MG tablet; Take 1 tablet (10 mg total) by mouth daily.    Osteoarthritis of hip, unspecified laterality, unspecified osteoarthritis type  -     celecoxib (CELEBREX) 200 MG capsule; 1 po two times a day as needed for pain    Bronchospasm  -     albuterol (VENTOLIN HFA) 90 mcg/actuation inhaler; Inhale 2 puffs four times a day prn wheezing    Immunization counseling  -     Td, Adult, Adsorbed (blue label); Future        Patient follows up on multiple medical conditions including diabetes hypertension hyperlipidemia microalbuminuria osteoarthritis.    Patient had med refills as outlined.    He will come in for labs we will check lipids and liver function he is on the atorvastatin    We will check blood pressure and renal  function he is on the amlodipine and lisinopril.    Continue Celebrex for the osteoarthritis pain mainly in his knees and hips    Immunization counseling, tetanus and flu shot.    Colon cancer screening we will check Cologuard.    Refill on albuterol to use as needed for bronchospasm    Patient be contacted with results and discuss follow-up likely 3 months          I reviewed the  Maria Esther Gordon's assessment from 11/18/2020  Review of external notes as documented in note  Review of the result(s) of each unique test - Knee x-ray personally reviewed from 10/2018 showing bilateral moderate joint space narrowing medially      22 minutes spent on the date of the encounter doing chart review, history and exam, documentation and further activities as noted above  [id number]}     Tobacco Cessation:   reports that he has been smoking cigarettes. He started smoking about 5 years ago. He has never used smokeless tobacco.    No follow-ups on file.    Ethan Colunga MD  [facility]    Subjective   Lalit Geiger is 65 y.o. and presents today for the following health issues   HPI   This patient had a virtual visit, video, due to the coronavirus pandemic.    The patient had video visit because of some difficulty part of the visit was telephone.    Patient has diabetes mellitus.   -Diabetic diet  -Continue home diabetes regimen  -Start sliding-scale insulin hypertension hyperlipidemia also osteoarthritis with chronic knee and hip pain.    Patient is trying to work on his weight we discussed diet and exercise    He also needed refill on his medicines please see med refill.    Patient needed immunization update with a flu shot and a tetanus shot he will get that when he comes in    I also prescribed a Cologuard for him and we will send that out.    Patient will come in for labs he was supposed to get lipid CMP A1c and microalbumin this fall but never came in    The last A1c we have is back in November  2019, A1c was 7.8.  He continues on Metformin 500 mg 2 twice a day.    Her blood pressure amlodipine and lisinopril.  For hyperlipidemia atorvastatin.  He is on an aspirin daily    For his knee and hip/osteoarthritis takes Celebrex.    No additional concern or issue no COVID-19 symptoms or exposure  Review of Systems  10 point review of systems positive as outlined above otherwise negative      Objective       Vitals:  No vitals were obtained today due to virtual visit.    Physical Exam  General appearance no acute distress    HEENT neck without tenderness no scleral icterus no nasal congestion or sore throat.    Lungs: Nonlabored breathing no wheezing.    Heart: No palpitations or rapid heart rate    Abdomen nontender    He does have pain with ambulation in through his hips and knees he states.  The Celebrex helps.    Skin is normal no rashes    No numbness or atrophic changes in through the feet.    Patient will come in for labs of lipid CMP A1c and microalbumin will check blood pressure    Referral for Cologuard    Immunization of tetanus and flu.  Also encouraged to get the COVID-19 vaccination when his age allows          Video-Visit Details    Type of service:  Video Visit    Video End Time (time video stopped): 3:18 PM  Originating Location (pt. Location): Home    Distant Location (provider location):  [facility]     Platform used for Video Visit: [place]

## 2021-06-15 NOTE — TELEPHONE ENCOUNTER
Left message #1 at [phone]. Postponing task out to a week and will try again. If patient returns call back, please help patient schedule an appointment per message below. Thanks!

## 2021-06-15 NOTE — TELEPHONE ENCOUNTER
Requested Prescriptions     Pending Prescriptions Disp Refills     metFORMIN (GLUCOPHAGE) 500 MG tablet 120 tablet 0

## 2021-06-15 NOTE — PROGRESS NOTES
[facility] Care Coordination    CHW, lm to remind mbr to schedule appts for colon screening,diabetic eye and annual wellness exams.      АЛЕКСАНДР Vyas  [facility]  [phone]

## 2021-06-15 NOTE — TELEPHONE ENCOUNTER
Patient Returning Call  Reason for call:  Patient returning missed call from clinic staff   Information relayed to patient:  Writer relayed clinic/provider encounter messages as written.   Patient has additional questions:  No  If YES, what are your questions/concerns:  Patient states understanding and did scheduled appointments at the [facility] loction  Okay to leave a detailed message?: Yes

## 2021-06-16 NOTE — TELEPHONE ENCOUNTER
Left message #1 at [phone]. Postponing task out to a week and will try again. If patient returns call back, please help patient schedule an appointment per message below. Thanks!

## 2021-06-16 NOTE — TELEPHONE ENCOUNTER
RN cannot approve Refill Request    RN can NOT refill this medication PCP messaged that patient is overdue for Labs and Blood Pressure Check.. Last office visit: 11/25/2019 Ethan Colunga MD Last Physical: 6/6/2017 Last MTM visit: Visit date not found Last visit same specialty: 11/25/2019 Ethan Colunga MD.  Next visit within 3 mo: Visit date not found  Next physical within 3 mo: Visit date not found      Mabel Horton, [facility] Triage/Med Refill 4/22/2021    Requested Prescriptions   Pending Prescriptions Disp Refills     metFORMIN (GLUCOPHAGE) 500 MG tablet [Pharmacy Med Name: METFORMIN  MG TABLET] 120 tablet 0     Sig: TAKE 2 TABLETS BY MOUTH TWICE A DAY WITH MEALS (NEED LABS BEFORE REFILL)       Metformin Refill Protocol Failed - 4/22/2021 12:30 PM        Failed - Blood pressure in last 12 months     BP Readings from Last 1 Encounters:   11/25/19 116/58             Failed - LFT or AST or ALT in last 12 months     Albumin   Date Value Ref Range Status   08/21/2019 3.9 3.5 - 5.0 g/dL Final     Bilirubin, Total   Date Value Ref Range Status   08/21/2019 0.4 0.0 - 1.0 mg/dL Final     Bilirubin, Direct   Date Value Ref Range Status   06/22/2016 0.2 <=0.5 mg/dL Final     Alkaline Phosphatase   Date Value Ref Range Status   08/21/2019 88 45 - 120 U/L Final     AST   Date Value Ref Range Status   08/21/2019 23 0 - 40 U/L Final     ALT   Date Value Ref Range Status   08/21/2019 31 0 - 45 U/L Final     Protein, Total   Date Value Ref Range Status   08/21/2019 7.6 6.0 - 8.0 g/dL Final                Failed - GFR or Serum Creatinine in last 6 months     GFR MDRD Non Af Amer   Date Value Ref Range Status   08/21/2019 >60 >60 mL/min/1.73m2 Final     GFR MDRD Af Amer   Date Value Ref Range Status   08/21/2019 >60 >60 mL/min/1.73m2 Final             Failed - A1C in last 6 months     Hemoglobin A1c   Date Value Ref Range Status   11/25/2019 7.8 (H) 3.5 - 6.0 % Final               Failed - Microalbumin in  last year      Microalbumin, Random Urine   Date Value Ref Range Status   07/31/2018 2.01 (H) 0.00 - 1.99 mg/dL Final                  Passed - Visit with PCP or prescribing provider visit in last 6 months or next 3 months     Last office visit with prescriber/PCP: Visit date not found OR same dept: Visit date not found OR same specialty: 11/25/2019 Ethan Colunga MD Last physical: Visit date not found Last MTM visit: Visit date not found         Next appt within 3 mo: Visit date not found  Next physical within 3 mo: Visit date not found  Prescriber OR PCP: Ethan Colunga MD  Last diagnosis associated with med order: 1. Diabetes mellitus (H)  - metFORMIN (GLUCOPHAGE) 500 MG tablet [Pharmacy Med Name: METFORMIN  MG TABLET]; TAKE 2 TABLETS BY MOUTH TWICE A DAY WITH MEALS (NEED LABS BEFORE REFILL)  Dispense: 120 tablet; Refill: 0     If protocol passes may refill for 12 months if within 3 months of last provider visit (or a total of 15 months).

## 2021-06-16 NOTE — TELEPHONE ENCOUNTER
Left message #3 at [phone]. If patient returns call back, please help patient schedule an appointment per message below. Thanks! We have made several attempts to contact patient by phone and letter to schedule an appointment. Unfortunately, our calls have not been returned and we were unable to schedule. At this time, we will no longer make an attempt to schedule this appointment. Completing task.

## 2021-06-16 NOTE — TELEPHONE ENCOUNTER
Left message #2 at [phone]. Sending letter out and postponing task out to 2 weeks and will try again if an appointment hasn't been made. If patient returns call back, please help patient schedule an appointment per message below. Thanks!

## 2021-06-16 NOTE — TELEPHONE ENCOUNTER
LMTCB #1 to see if patient was interested in getting the COVID-19 Vaccine at [facility] on 3/27 if he hasn't already.    If he got it, please notate this information down and re-route it to the provider pool. Thanks.    Name of vaccine:  Place of vaccination:  Date of 1st dose:  Date of 2nd dose:  Lot #: (patient may or may not have this)

## 2021-06-16 NOTE — TELEPHONE ENCOUNTER
Encounter Date: 2018       History     Chief Complaint   Patient presents with    Chest Pain     chest pain started last night. pain is over left breast, no radiation,  intermittent, sharp, accompanied with nausea, sob, and diaphoresis. denies any cardiac hx.      Patient developed left-sided chest pain last night above her left breast and it improved but this afternoon at work it really occurred.  She was able to finish work and came here for evaluation.  The pain is like a shock and is transient and does not radiate and located in the left pectoral area above her breast      The history is provided by the patient.   Chest Pain   The current episode started yesterday. Duration of episode(s) is 5 seconds. Chest pain occurs intermittently. The chest pain is improving. The pain is associated with breathing and exertion. At its most intense, the chest pain is at 7/10. The chest pain is currently at 3/10. The quality of the pain is described as sharp. The pain does not radiate. Chest pain is worsened by certain positions.   Pertinent negatives for associated symptoms include no claudication, no diaphoresis, no near-syncope, no orthopnea and no weakness.     Review of patient's allergies indicates:  No Known Allergies  Past Medical History:   Diagnosis Date    Diabetes mellitus, type 2     Hypertension     Vision impairment     wears glasses     History reviewed. No pertinent surgical history.  Family History   Problem Relation Age of Onset    Hypertension Mother 55            Diabetes Mother     Cancer Mother         cervical    Heart disease Father     Diabetes Sister     Hypertension Sister     Cancer Sister         cervical    Diabetes Maternal Grandmother      Social History   Substance Use Topics    Smoking status: Never Smoker    Smokeless tobacco: Never Used    Alcohol use No     Review of Systems   Constitutional: Negative for diaphoresis.   HENT: Negative.    Eyes: Negative.   RN cannot approve Refill Request    RN can NOT refill this medication PCP messaged that patient is overdue for Labs. Last office visit: 11/25/2019 Ethan Colunga MD Last Physical: 6/6/2017 Last MTM visit: Visit date not found Last visit same specialty: 11/25/2019 Ethan Colunga MD.  Next visit within 3 mo: Visit date not found  Next physical within 3 mo: Visit date not found      Divya Urias, [facility] Triage/Med Refill 3/20/2021    Requested Prescriptions   Pending Prescriptions Disp Refills     metFORMIN (GLUCOPHAGE) 500 MG tablet [Pharmacy Med Name: METFORMIN  MG TABLET] 120 tablet 0     Sig: TAKE 2 TABLETS BY MOUTH TWICE A DAY WITH MEALS (NEED LABS BEFORE REFILL)       Metformin Refill Protocol Failed - 3/20/2021  7:31 AM        Failed - Blood pressure in last 12 months     BP Readings from Last 1 Encounters:   11/25/19 116/58             Failed - LFT or AST or ALT in last 12 months     Albumin   Date Value Ref Range Status   08/21/2019 3.9 3.5 - 5.0 g/dL Final     Bilirubin, Total   Date Value Ref Range Status   08/21/2019 0.4 0.0 - 1.0 mg/dL Final     Bilirubin, Direct   Date Value Ref Range Status   06/22/2016 0.2 <=0.5 mg/dL Final     Alkaline Phosphatase   Date Value Ref Range Status   08/21/2019 88 45 - 120 U/L Final     AST   Date Value Ref Range Status   08/21/2019 23 0 - 40 U/L Final     ALT   Date Value Ref Range Status   08/21/2019 31 0 - 45 U/L Final     Protein, Total   Date Value Ref Range Status   08/21/2019 7.6 6.0 - 8.0 g/dL Final                Failed - GFR or Serum Creatinine in last 6 months     GFR MDRD Non Af Amer   Date Value Ref Range Status   08/21/2019 >60 >60 mL/min/1.73m2 Final     GFR MDRD Af Amer   Date Value Ref Range Status   08/21/2019 >60 >60 mL/min/1.73m2 Final             Failed - A1C in last 6 months     Hemoglobin A1c   Date Value Ref Range Status   11/25/2019 7.8 (H) 3.5 - 6.0 % Final               Failed - Microalbumin in last year         Respiratory: Negative.    Cardiovascular: Positive for chest pain. Negative for orthopnea, claudication and near-syncope.   Gastrointestinal: Negative.    Endocrine: Negative.    Genitourinary: Negative.    Musculoskeletal: Negative.    Skin: Negative.    Allergic/Immunologic: Negative.    Neurological: Negative.  Negative for weakness.   Hematological: Negative.    Psychiatric/Behavioral: Negative.        Physical Exam     Initial Vitals [08/09/18 1425]   BP Pulse Resp Temp SpO2   (!) 159/102 84 16 97.8 °F (36.6 °C) 99 %      MAP       --         Physical Exam    Nursing note and vitals reviewed.  Constitutional: She appears well-developed and well-nourished.   HENT:   Head: Normocephalic.   Eyes: Conjunctivae are normal.   Neck: Normal range of motion.   Cardiovascular: Normal rate, regular rhythm, normal heart sounds and intact distal pulses.   Pulmonary/Chest: Breath sounds normal.   Abdominal: Soft.   Musculoskeletal: Normal range of motion.   Neurological: She is alert and oriented to person, place, and time.   Skin: Skin is warm and dry. Capillary refill takes less than 2 seconds.   Psychiatric: She has a normal mood and affect.         ED Course   Procedures  Labs Reviewed   CBC W/ AUTO DIFFERENTIAL - Abnormal; Notable for the following:        Result Value    MCV 81 (*)     All other components within normal limits   COMPREHENSIVE METABOLIC PANEL - Abnormal; Notable for the following:     Glucose 303 (*)     ALT 76 (*)     All other components within normal limits   PROTIME-INR - Abnormal; Notable for the following:     Prothrombin Time 14.1 (*)     INR 1.3 (*)     All other components within normal limits   TROPONIN I   D DIMER     EKG Readings: (Independently Interpreted)   Previous EKG: Compared with most recent EKG Rhythm: Normal Sinus Rhythm. ST Segments: Normal ST Segments. T Waves: Normal.   No change since 2016 EKG       Imaging Results          X-Ray Chest AP Portable (Final result)  Result time  Microalbumin, Random Urine   Date Value Ref Range Status   07/31/2018 2.01 (H) 0.00 - 1.99 mg/dL Final                  Passed - Visit with PCP or prescribing provider visit in last 6 months or next 3 months     Last office visit with prescriber/PCP: Visit date not found OR same dept: Visit date not found OR same specialty: 11/25/2019 Ethan Colunga MD Last physical: Visit date not found Last MTM visit: Visit date not found         Next appt within 3 mo: Visit date not found  Next physical within 3 mo: Visit date not found  Prescriber OR PCP: Ethan Colunga MD  Last diagnosis associated with med order: 1. Diabetes mellitus (H)  - metFORMIN (GLUCOPHAGE) 500 MG tablet [Pharmacy Med Name: METFORMIN  MG TABLET]; TAKE 2 TABLETS BY MOUTH TWICE A DAY WITH MEALS (NEED LABS BEFORE REFILL)  Dispense: 120 tablet; Refill: 0     If protocol passes may refill for 12 months if within 3 months of last provider visit (or a total of 15 months).                  08/09/18 14:49:48    Final result by Randall Foreman Jr., MD (08/09/18 14:49:48)                 Impression:      No acute findings.      Electronically signed by: Randall Foreman MD  Date:    08/09/2018  Time:    14:49             Narrative:    EXAMINATION:  XR CHEST AP PORTABLE    CLINICAL HISTORY:  Chest pain, unspecified    COMPARISON:  03/12/2016    FINDINGS:  Heart size is normal.  Lungs appear clear of active disease.  No infiltrates or effusions.  No suspicious mass.                                 Medical Decision Making:   Initial Assessment:   Noncardiac chest pain  ED Management:  Chest pain workup was done.  Laboratory results EKG and chest x-ray are all normal.  She was asymptomatic at time of discharge. She is followed by cardiologist Dr. Oden and recommend follow-up with him                      Clinical Impression:   The primary encounter diagnosis was Atypical chest pain. A diagnosis of Chest pain was also pertinent to this visit.      Disposition:   Disposition: Discharged  Condition: Stable                        C. Yusef Moerno III, MD  08/09/18 1356

## 2021-06-19 NOTE — PROGRESS NOTES
Subjective: Patient comes in for follow-up he has been here for about a year.    He is on lisinopril amlodipine metformin and aspirin.    Blood pressures controlled    Is not checking blood sugars    He has had normal A1c's back in June 2017 A1c 6.5  normal renal PSA was normal    He has had some elevated liver function tests he does drink alcohol about 3 beers per day last AST 48 ALT 68.  This was rechecked today.    He has not been taking cholesterol medication.  He had been on Lipitor in the past I think it is probably worth getting him back on that if able to.    Patient's had some erectile dysfunction issues not able to maintain a erection as well as he likely discussed pros and cons regarding sildenafil and he will use 20 mg 2-3 tablets 1 hour prior to sex.    20 tablets refill ×2.    No additional concerns or issues.    He did not want colonoscopy or prostate exam    Tobacco status: He  reports that he has quit smoking. His smoking use included Cigarettes. He started smoking about 3 years ago. He has never used smokeless tobacco.    Patient Active Problem List    Diagnosis Date Noted     Hyperlipidemia 05/14/2015     HTN (hypertension) 05/14/2015     Diabetes mellitus (H) 05/14/2015     DJD (degenerative joint disease) of hip 05/14/2015     Osteoarthrosis, pelvic region and thigh 05/17/2013       Current Outpatient Prescriptions   Medication Sig Dispense Refill     amLODIPine (NORVASC) 10 MG tablet TAKE 1 TABLET (10 MG TOTAL) BY MOUTH DAILY. 90 tablet 2     lisinopril (PRINIVIL,ZESTRIL) 40 MG tablet Take 1 tablet (40 mg total) by mouth daily. 90 tablet 2     metFORMIN (GLUCOPHAGE) 500 MG tablet TAKE 1 TABLET BY MOUTH EVERY DAY. NEEDS OFFICE VISIT WITH DR. GARCIA 6 tablet 0     blood glucose meter (GLUCOMETER) Use 1 each As Directed as needed. Dispense glucometer brand per patient's insurance at pharmacy discretion. 1 each 0     blood glucose test (GLUCOSE BLOOD) strips Test blood sugars twice daily. Dx  code: E11.9 100 strip 5     generic lancets Test one time per day. Dispense brand per patient's insurance at pharmacy discretion. 100 each 11     LANCETS MISC Use As Directed daily.       lancing device Misc Use as directed to test blood sugars twice daily. Dispense brand per patient's insurance at pharmacy discretion. 1 each 0     sildenafil, antihypertensive, (REVATIO) 20 mg tablet 2-3 po 1 hour prior to sex as needed 20 tablet 3     VENTOLIN HFA 90 mcg/actuation inhaler Inhale 2 puffs every 4 (four) hours.       No current facility-administered medications for this visit.        ROS: 10 point review of systems negative other than as outlined above    Objective:    /68 (Patient Site: Left Arm, Patient Position: Sitting, Cuff Size: Adult Large)  Pulse (!) 58  Wt 192 lb (87.1 kg)  BMI 32.96 kg/m2  Body mass index is 32.96 kg/(m^2).      General appearance no acute distress    HEENT pupils react normally extract movements full neck is supple oropharynx is clear    Lungs are clear no rales rhonchi, heart was regular rate at 60    Abdomen nontender no guarding or rebound    Extremities without edema pulses normal, sensation normal.    Labs A1c 6.8 microalbumin CMP and lipid pending    Results for orders placed or performed in visit on 07/31/18   Glycosylated Hemoglobin A1c   Result Value Ref Range    Hemoglobin A1c 6.8 (H) 3.5 - 6.0 %       Assessment:  1. Type 2 diabetes mellitus without complication, without long-term current use of insulin  Comprehensive Metabolic Panel    Glycosylated Hemoglobin A1c    Microalbumin, Random Urine   2. HTN (hypertension)  Comprehensive Metabolic Panel   3. Hyperlipidemia  Comprehensive Metabolic Panel    Lipid Cascade RANDOM   4. ED (erectile dysfunction)  sildenafil, antihypertensive, (REVATIO) 20 mg tablet   5. Alcohol use     6. Elevated LFTs  Comprehensive Metabolic Panel     Diabetes mellitus controlled on metformin 500 mg 1 a day    Hypertension controlled on  lisinopril 40 mg and amlodipine 10 mg each once a day    Hyperlipidemia has not been on a statin will await results contact patient.    Elevated liver tests with alcohol use please see previous discussion AST and ALT of been in the 50-80 range    Plan: As outlined above.    In addition to erectile dysfunction discussed and treated as discussed    This transcription uses voice recognition software, which may contain typographical errors.

## 2021-06-19 NOTE — LETTER
Letter by Ethan Colunga MD at      Author: Ethan Colunga MD Service: -- Author Type: --    Filed:  Encounter Date: 10/28/2019 Status: Signed         [address]  [place] [address]                 November 4, 2019       Dear Mr. Geiger,    As a valued [facility] patient, your healthcare needs are our priority. Our clinic records indicate we have attempted to contact you, but have not heard back from you after three (3) attempts. Dr. Colunga sent you a prescription for lisinopril 40 mg 1 tablet a day, which was what you were on previously. Please monitor for any cough, sometimes the lisinopril can cause cough.       Please call with questions or contact us using [employer].      Sincerely,        Electronically signed by Ethan Colunga MD

## 2021-06-20 NOTE — PROGRESS NOTES
"Subjective: Patient comes in for evaluation 63-year-old male has diabetes last A1c was 6.8 on 7/31/2018 he is on metformin once daily.    Patient has microalbumin positive.  He has not started on his atorvastatin 10 mg a day.  He will do this we will see him in 4 weeks we will recheck A1c lipid AST ALT.  Last AST 54 ALT 51 he has normal renal function.    Patient has had some knee pain he fell a couple weeks ago he has pain in through the left knee he feels like that is a little bit \"looser \"and also has pain along the medial joint line bilaterally in both knees.    Is also had some abdominal discomfort.  He has had some increased bloating at times no vomiting no blood in the stool.    We discussed options elected to get abdominal x-ray please see below.    He continues to smoke encouraged him to quit.    No additional concerns blood pressure does look good today    Tobacco status: He  reports that he has been smoking Cigarettes.  He started smoking about 3 years ago. He has never used smokeless tobacco.    Patient Active Problem List    Diagnosis Date Noted     Microalbuminuria 10/10/2018     Hyperlipidemia 05/14/2015     HTN (hypertension) 05/14/2015     Diabetes mellitus (H) 05/14/2015     DJD (degenerative joint disease) of hip 05/14/2015     Osteoarthrosis, pelvic region and thigh 05/17/2013       Current Outpatient Prescriptions   Medication Sig Dispense Refill     amLODIPine (NORVASC) 10 MG tablet Take 1 tablet (10 mg total) by mouth daily. 90 tablet 3     atorvastatin (LIPITOR) 10 MG tablet Take 1 tablet (10 mg total) by mouth daily. 30 tablet 2     blood glucose meter (GLUCOMETER) Use 1 each As Directed as needed. Dispense glucometer brand per patient's insurance at pharmacy discretion. 1 each 0     blood glucose test (GLUCOSE BLOOD) strips Test blood sugars twice daily. Dx code: E11.9 100 strip 5     generic lancets Test one time per day. Dispense brand per patient's insurance at pharmacy discretion. 100 " each 11     LANCETS MISC Use As Directed daily.       lancing device Misc Use as directed to test blood sugars twice daily. Dispense brand per patient's insurance at pharmacy discretion. 1 each 0     lisinopril (PRINIVIL,ZESTRIL) 40 MG tablet Take 1 tablet (40 mg total) by mouth daily. 90 tablet 3     metFORMIN (GLUCOPHAGE) 500 MG tablet Take 1 tablet (500 mg total) by mouth daily. 90 tablet 0     sildenafil, antihypertensive, (REVATIO) 20 mg tablet 2-3 po 1 hour prior to sex as needed 20 tablet 3     VENTOLIN HFA 90 mcg/actuation inhaler Inhale 2 puffs every 4 (four) hours.       diclofenac (VOLTAREN) 75 MG EC tablet Take 1 tablet (75 mg total) by mouth 2 (two) times a day. 60 tablet 1     docusate sodium (COLACE) 100 MG capsule Take 1 capsule (100 mg total) by mouth daily as needed for constipation. 30 capsule 2     polyethylene glycol (GLYCOLAX) 17 gram/dose powder 17 gm in 8 oz water daily 510 g 6     No current facility-administered medications for this visit.        ROS:   10 point review of systems negative other than as outlined above    Patient's not had any swelling in his legs.  He does have pain in the medial joint line of the knees bilaterally.  No swelling in the joints.        Objective:    /60 (Patient Site: Left Arm, Patient Position: Sitting, Cuff Size: Adult Large)  Pulse 72  Temp 98.6  F (37  C) (Oral)   Resp 16  Wt 200 lb (90.7 kg)  SpO2 95%  BMI 34.33 kg/m2  Body mass index is 34.33 kg/(m^2).    General appearance no acute distress    HEENT neck negative oropharynx is clear    Lungs clear no rales or rhonchi heart was regular S1-S2 O2 sat 95%.    Abdomen was soft bowel sounds normal no guarding or rebound no masses no reproduction of pain no epigastric pain back without CVA pain  Patient does have an abdominal hernia this is easily reduced this is not giving him pain he states.  Abdominal x-ray showed some increased stool throughout the colon.    Otherwise  unremarkable    Extremities without edema patient has medial joint line pain in both knees.  Some discomfort posterior on the left negative Lockman negative Klever.    X-rays did show moderate to severe medial joint line narrowing and patellofemoral degenerative arthritis.    Discussed options he did not want an injection he will try medication.  We discussed and elected use diclofenac            Assessment:  1. Bilateral knee pain  XR Knees Bilateral 1 Or 2 VWS   2. Hyperlipidemia     3. Type 2 diabetes mellitus without complication, without long-term current use of insulin  atorvastatin (LIPITOR) 10 MG tablet   4. Microalbuminuria     5. Umbilical hernia without obstruction and without gangrene     6. Abdominal pain  XR Abdomen Flat and Upright    docusate sodium (COLACE) 100 MG capsule    polyethylene glycol (GLYCOLAX) 17 gram/dose powder   7. Constipation     8. Primary osteoarthritis of right knee  diclofenac (VOLTAREN) 75 MG EC tablet   9. Primary osteoarthritis of left knee  diclofenac (VOLTAREN) 75 MG EC tablet     Bilateral knee pain secondary to osteoarthritis use diclofenac 75 mg twice daily do some straight leg quadricep strengthening    Diabetes mellitus with microalbuminuria.  Continue the same    Go on Lipitor 10 mg a day.    Abdominal pain with hernia but the hernia does not seem to be causing any pain.  I think he does have some constipation and will try docusate and MiraLAX.  Follow-up in 4 weeks    Plan: As outlined above follow-up 4 weeks check AST ALT lipid and A1c    This transcription uses voice recognition software, which may contain typographical errors.

## 2021-06-20 NOTE — LETTER
Letter by Maria Esther Singh SW at      Author: Maria Esther Singh SW Service: -- Author Type: --    Filed:  Encounter Date: 5/4/2020 Status: (Other)       May 4, 2020    Important [employer] Information    EDWARDO BOYLE  [address]  [place] [address]  A Partner in Your Care  Dear Edwardo,  Thank you for choosing [employer] for your health plan coverage! I am excited to welcome you as a member of [employer] .  My name is TIEN Saldivar, and I will be working with you as your Care Coordinator. [employer] partners with [employer] to provide members with Care Coordination services.  As your Care Coordinator, I can:    Work with you to create a Care Plan to keep you healthy and safe    Help you make appointments to see health care providers     Support you and your family in making health care decisions    Find community services that may interest you    Identify health benefits you are eligible for  What happens next?  To get started, I will call you. Ill ask you a few questions about your health and schedule a time to meet. You will have a chance to ask me questions, too.  Questions?  Call me at [phone] Monday-Friday between 8am and 5pm. TTY/TDD: 711. I look forward to speaking with you soon.  Sincerely,      TIEN Saldivar    E-mail: [email or web address]  [phone]      [employer]          cc: member records                                Civil Rights Notice  Discrimination is against the law. [employer] does not discriminate on the basis of any of the following:    Race    Color    National Origin    Creed    Yazdanism    Age    Public Assistance Status    Receipt of Health Care Services    Disability (including physical or mental impairment)    Sex (including sex stereotypes and gender identity)    Marital Status    Political Beliefs    Medical Condition    Genetic Information    Sexual Orientation    Claims Experience    Medical History    Health  Status    Auxiliary Aids and Services:  [employer] provides auxiliary aids and services, like qualified interpreters or information in accessible formats, free of charge and in a timely manner, to ensure an equal opportunity to participate in our health care programs. Contact [employer] Customer Service at [employer]/contact medicaid or call [phone] (toll free); TTY:711 or at [employer]/contactmedicaid.    Language Assistance Services:  [employer] provides translated documents and spoken language interpreting, free of charge and in a timely manner, when language assistance services are necessary to ensure limited English speakers have meaningful access to our information and services. Contact [employer] at [phone] (toll free); TTY: 718 or [employer]/contactQulsarid.     Civil Rights Complaints  You have the right to file a discrimination complaint if you believe you were treated in a discriminatory way by [employer]. You may contact any of the following four agencies directly to file a discrimination complaint.    U.S. Department of Health and Human Services Office for Civil Rights (OCR)  You have the right to file a complaint with the OCR, a federal agency, if you believe you have been discriminated against because of any of the following:    Race    Disability    Color    Sex    National Origin    Age      Contact the OCR directly to file a complaint:         Director         U.S. Department of Health and Human Services Office for Civil Rights         [address] [place] [address]         [phone] (Voice)         [phone] (TDD)         Complaint Portal - https://ocrportal.hhs.gov/ocr/portal/lobby.jsf     [place] Department of Human Rights (MDHR)  In [place], you have the right to file a complaint with the [facility] if you believe you have been discriminated against because of any of the following:      Race    Color    National  Origin    Lutheran    Creed    Sex    Sexual Orientation    Marital Status    Public Assistance Status    Disability    Contact the MDHR directly to file a complaint:         [place] Department of Human Rights         [facility], [address] [place] [address]         [phone] (voice)          [phone] (toll free)         711 or [phone] ([place] Relay)         [phone] (Fax)         [email or web address]. (Email)     [place] Department of Human Services (DHS)  You have the right to file a complaint with [facility] if you believe you have been discriminated against in our health care programs because of any of the following:    Race    Color    National Origin    Creed    Lutheran    Age    Public Assistance Status    Receipt of Health Care Services    Disability (including physical or mental impairment)    Sex (including sex stereotypes and gender identity)    Marital Status    Political Beliefs    Medical Condition    Genetic Information    Sexual Orientation    Claims Experience    Medical History    Health Status    Complaints must be in writing and filed within 180 days of the date you discovered the alleged discrimination. The complaint must contain your name and address and describe the discrimination you are complaining about. After we get your complaint, we will review it and notify you in writing about whether we have authority to investigate. If we do, we will investigate the complaint.      [facility] will notify you in writing of the investigations outcome. You have a right to appeal the outcome if you disagree with the decision. To appeal, you must send a written request to have [facility] review the investigation outcome period. Be brief and state why you disagree with the decision. Include additional information you think is important.      If you file a complaint in this way, the people who work for the agency named in the complaint cannot retaliate against you. This means they  cannot punish you in any way for filing a complaint. Filing a complaint in this way does not stop you from seeking out other legal or administration actions.     Contact [facility] directly to file a discrimination complaint:        Civil Rights Coordinator        [place] Department of Human Services        Equal Opportunity and Access Division        [address] [address]        [place] [address] [phone] (voice) or use your preferred relay service     [facility] Complaint Notice   You have the right to file a complaint with [employer] if you believe you have been discriminated against because of any of the following:       Medical condition    Health status    Receipt of health care services    Claims experience    Medical history    Genetic information    Disability (including mental or physical impairment)    Marital status    Age    Sex (including sex stereotypes and gender identity)    Sexual orientation    National origin    Race    Color    Jain    Creed    Public assistance status    Political beliefs    You can file a complaint and ask for help in filing a complaint in person or by mail, phone, fax, or email at:     [employer] Civil Rights Coordinator  [facility] [employer] [facility]  [address], [place] [address] [phone] (voice and fax) or TTZ:011  Email: [email or web address]    American Indians can continue to use [place] and [facility] ([facility]) clinics. We will not require prior approval or impose any conditions for you to get services at these clinics. For elders age 65 years and older this includes Elderly Waiver (EW) services accessed through the [facility]. If a doctor or other provider in a [place] or [facility] clinic refers you to a provider in our network, we will not require you to see your primary care provider prior to the referral.    For accessible formats of this publication or assistance with equal or access to our services, visit [employer]/contactmedicaid, or  call [phone] (toll free) or use your preferred relay service.

## 2021-06-20 NOTE — LETTER
Letter by Ethan Colunga MD at      Author: Ethan Colunga MD Service: -- Author Type: --    Filed:  Encounter Date: 7/22/2020 Status: (Other)         Lalit Geiger  [address]  [place] [address]      July 22, 2020      Dear Lalit,    As a valued [facility] patient, your healthcare needs are our priority.  Your health care team has determined that you are due for an appointment regarding your medications.    To help prevent delays in your care, please call the [facility] at [phone].    We look forward to partnering with you to achieve optimal health and wellbeing.    Sincerely,  Your care team at [facility] and [facility]

## 2021-06-20 NOTE — LETTER
Letter by Ethan Colunga MD at      Author: Ethan Colunga MD Service: -- Author Type: --    Filed:  Encounter Date: 6/23/2020 Status: (Other)         Lalit Geiger  [address]  [place] [address]      June 23, 2020      Dear Lalit,    As a valued [facility] patient, your healthcare needs are our priority.  Your health care team has determined that you are due for an appointment regarding your annual wellness visit virtual .    To help prevent delays in your care, please call the [facility] at [phone].    We look forward to partnering with you to achieve optimal health and wellbeing.    Sincerely,  Your care team at [facility] and [facility]

## 2021-06-20 NOTE — LETTER
Letter by Ethan Colunga MD at      Author: Ethan Colunga MD Service: -- Author Type: --    Filed:  Encounter Date: 10/8/2020 Status: (Other)         Lalit Geiger  [address]  [place] [address]      October 8, 2020      Dear Lalit,    As a valued [facility] patient, your healthcare needs are our priority.  Your health care team has determined that you are due for an appointment regarding your medication follow up.    To help prevent delays in your care, please call the [facility] at [phone].    We look forward to partnering with you to achieve optimal health and wellbeing.    Sincerely,  Your care team at [facility]

## 2021-06-21 NOTE — LETTER
Letter by Ethan Colunga MD at      Author: Ethan Colunga MD Service: -- Author Type: --    Filed:  Encounter Date: 4/22/2021 Status: (Other)         Lalit Geiger  [address] [place] [address]      April 23, 2021      Dear Lalit,    As a valued [facility] patient, your healthcare needs are our priority.  Your health care team has determined that you are due for an appointment regarding your OFFICE VISIT WITH [facility].    To help prevent delays in your care, please call the [facility] at [phone].    We look forward to partnering with you to achieve optimal health and wellbeing.    Sincerely,  Your care team at [facility]

## 2021-06-21 NOTE — LETTER
Letter by Maria Esther Snigh SW at      Author: Maria Esther Singh SW Service: -- Author Type: --    Filed:  Encounter Date: 11/27/2020 Status: (Other)       Honoring [email or web address] Advance Care Planning       Lalit Geiger  [address] [place] [address]      Dear Lalit,    You shared with me your interest in receiving information on Advance Care Planning and Health Care Directives. Discussing and making decisions about this part of our health is very important.  A Health Care Directive is a written document that outlines your goals, values, beliefs and choices for health care and medical treatment in the event you are unable to speak for yourself.     We greatly value the opportunity to assist you in documenting your choices and to honor your   wishes. Weve enclosed resources to help you get started thinking about your values and goals. We have several options for additional resources:       Health Care Directives and Advance Care Planning resources can be viewed and printed   for free at our web site:  [email or web address].       Free group classes on Advance Care Planning and completing a Health Care Directive are available at multiple locations and times. These classes are led by trained staff who will provide information and guide you through a Health Care Directive.  They can also review, notarize and add your Health Care Directive to your medical record. [place] for a class at [email or web address] or by calling [employer] at [phone] or toll free [phone].      COPIES of completed Health Care Directives can be brought or mailed to any of our   locations, including the address listed below. You can also email a copy to [email or web address] .      Email or call me at the contact information listed below for questions, assistance, or to   make an appointment to discuss creating a Health Care Directive. You can also contact   our [employer] [employer] Department for questions  or assistance.       Sincerely,     Maria Esther Singh, Care Coordinator  [facility]  [email or web address]

## 2021-06-21 NOTE — LETTER
Letter by Ethan Colunga MD at      Author: Ethan Colunga MD Service: -- Author Type: --    Filed:  Encounter Date: 3/29/2021 Status: (Other)         Lalit Geiger  [address] [place] [address]      March 29, 2021      Dear Lalit,    As a valued [facility] patient, your healthcare needs are our priority.  Your health care team has determined that you are due for an appointment regarding your medication follow up.    To help prevent delays in your care, please call the [facility] at [phone].    We look forward to partnering with you to achieve optimal health and wellbeing.    Sincerely,  Your care team at [facility] [place] [facility]

## 2021-06-21 NOTE — LETTER
Letter by Maria Esther Singh SW at      Author: Maria Esther Singh SW Service: -- Author Type: --    Filed:  Encounter Date: 11/27/2020 Status: (Other)       November 27, 2020    Important [facility] Information    EDWARDO RAYO TAMAR  [address] [place] [address] [address] [place] [address]  Your Care Plan  Dear Edwardo,  When we spoke recently, I promised to send you a Care Plan. The plan enclosed is a summary of our discussion. It includes the steps we agreed would help you meet your health goals. In addition, I can help you with:  Egtge[employer]-A-YavsHJ  This program is available to members who need a ride to medical and dental visits. To schedule a ride, call [phone] or [phone] (toll free). TTY/TTD: 711. You can call 8 a.m. to 8 p.m. Seven days a week. Access to a representative may be limited at times.      Proxeon   The Proxeon program empowers you to improve your health through education and exercise. To learn more, visit [employer], or call [employer] Service at [phone] (toll free) (TTY:711) from 7 a.m. - 7 p.m. Central Time, Monday-Friday.  Health Care Directive   This form helps you outline your health care wishes. You can request a form from me and I will answer any questions you have before you discuss it with your doctor.   Annual Physical  Take a key step on your path to good health and set up an annual physical at your clinic.  Questions?  Call me at [phone] Monday-Friday between 8am and 5pm.  TTY/TTD: 711. As we discussed, I plan to be in touch with you again in 6 months to follow up via phone.  Sincerely,      TIEN Saldivar    E-mail: [email or web address]  [phone]      [facility]          cc: member records                Civil Rights Notice  Discrimination is against the law. [employer] does not discriminate on the basis of any of the following:    Race    Color    National Origin    Creed    Episcopal    Age    Public Assistance  Status    Receipt of Health Care Services    Disability (including physical or mental impairment)    Sex (including sex stereotypes and gender identity)    Marital Status    Political Beliefs    Medical Condition    Genetic Information    Sexual Orientation    Claims Experience    Medical History    Health Status    Auxiliary Aids and Services:  [employer] provides auxiliary aids and services, like qualified interpreters or information in accessible formats, free of charge and in a timely manner, to ensure an equal opportunity to participate in our health care programs. Contact [employer] Customer Service at [employer]/contact medicaid or call [phone] (toll free); TTY:719 or at [employer]/contactmedicaid.    Language Assistance Services:  [employer] provides translated documents and spoken language interpreting, free of charge and in a timely manner, when language assistance services are necessary to ensure limited English speakers have meaningful access to our information and services. Contact [employer] at [phone] (toll free); TTY: 645 or [employer]/contactmedicaid.     Civil Rights Complaints  You have the right to file a discrimination complaint if you believe you were treated in a discriminatory way by [facility]. You may contact any of the following four agencies directly to file a discrimination complaint.    U.S. Department of Health and Human Services Office for Civil Rights (OCR)  You have the right to file a complaint with the OCR, a federal agency, if you believe you have been discriminated against because of any of the following:    Race    Disability    Color    Sex    National Origin    Age      Contact the OCR directly to file a complaint:         Director         U.S. Department of Health and Human Services Office for Civil Rights         [address] [place] [address], [place] [id number] [phone] (Voice)         [phone] (TDD)         Complaint  Portal - [email or web address]     [place] Department of Human Rights (MDHR)  In [place], you have the right to file a complaint with the MDHR if you believe you have been discriminated against because of any of the following:      Race    Color    National Origin    Latter-day    Creed    Sex    Sexual Orientation    Marital Status    Public Assistance Status    Disability    Contact the MDHR directly to file a complaint:         [place] Department of Human Rights         [facility], [address] [place] [address]         [phone] (voice)          [phone] (toll free)         [phone] or [phone] ([place] Relay)         [phone] (Fax)         [email or web address] (Email)     [place] Department of Human Services (DHS)  You have the right to file a complaint with [facility] if you believe you have been discriminated against in our health care programs because of any of the following:    Race    Color    National Origin    Creed    Latter-day    Age    Public Assistance Status    Receipt of Health Care Services    Disability (including physical or mental impairment)    Sex (including sex stereotypes and gender identity)    Marital Status    Political Beliefs    Medical Condition    Genetic Information    Sexual Orientation    Claims Experience    Medical History    Health Status    Complaints must be in writing and filed within 180 days of the date you discovered the alleged discrimination. The complaint must contain your name and address and describe the discrimination you are complaining about. After we get your complaint, we will review it and notify you in writing about whether we have authority to investigate. If we do, we will investigate the complaint.      [facility] will notify you in writing of the investigations outcome. You have a right to appeal the outcome if you disagree with the decision. To appeal, you must send a written request to have [facility] review the  investigation outcome period. Be brief and state why you disagree with the decision. Include additional information you think is important.      If you file a complaint in this way, the people who work for the agency named in the complaint cannot retaliate against you. This means they cannot punish you in any way for filing a complaint. Filing a complaint in this way does not stop you from seeking out other legal or administration actions.     Contact DHS directly to file a discrimination complaint:        Civil Rights Coordinator        [place] Department of Human Services        Equal Opportunity and Access Division        [address] [address]        [place] [address] [phone] (voice) or use your preferred relay service     [facility] Complaint Notice   You have the right to file a complaint with [employer] if you believe you have been discriminated against because of any of the following:       Medical condition    Health status    Receipt of health care services    Claims experience    Medical history    Genetic information    Disability (including mental or physical impairment)    Marital status    Age    Sex (including sex stereotypes and gender identity)    Sexual orientation    National origin    Race    Color    Temple    Creed    Public assistance status    Political beliefs    You can file a complaint and ask for help in filing a complaint in person or by mail, phone, fax, or email at:     [facility] Civil Rights Coordinator  [facility]  [address], [place] [address] [phone] (voice and fax) or TTZ:870  Email: [email or web address]    American Indians can continue to use [facility] and [facility]) clinics. We will not require prior approval or impose any conditions for you to get services at these clinics. For elders age 65 years and older this includes Elderly Waiver (EW) services accessed through the [place]. If a doctor or other  provider in a [facility] or [facility] clinic refers you to a provider in our network, we will not require you to see your primary care provider prior to the referral.    For accessible formats of this publication or assistance with equal or access to our services, visit [employer]slistmedicaid, or call [phone] (toll free) or use your preferred relay service.

## 2021-06-22 NOTE — PROGRESS NOTES
Subjective: Patient comes in for follow-up he was seen at [facility] on 12/3/2018 and treated with Decadron duo nebs 4 bronchitis and bronchospasm has an albuterol inhaler he is on azithromycin    White count was normal at 7700 hemoglobin 13.6 blood sugar 271 normal renal function    Negative troponin.  EKG no change since 2016 with some nonspecific T wave changes chest x-ray was clear    Patient received Decadron in the hospital    I did recheck his A1c today A1c back in July was 6.8 microalbumin positive slight elevated AST and ALT at that time and LDL was 116.    He continues on amlodipine and lisinopril for blood pressure atorvastatin, for lipids.    Patient fell yesterday after he was out of the hospital and injured his lower back is quite stiff he is got some spasm through the left thoracic area    He ran out of his diclofenac which she is taking for knee arthritis a refill that and also gave him some tizanidine for spasm.  Please see below    Has been on metformin 500 mg 1 a day.  A1c is now up to 10.5 today I increased him to 500 mg 2 in the morning with breakfast 2 at supper.    Encouraged him to quit smoking.    Tobacco status: He  reports that he has been smoking cigarettes.  He started smoking about 3 years ago. he has never used smokeless tobacco.    Patient Active Problem List    Diagnosis Date Noted     Microalbuminuria 10/10/2018     Hyperlipidemia 05/14/2015     HTN (hypertension) 05/14/2015     Diabetes mellitus (H) 05/14/2015     DJD (degenerative joint disease) of hip 05/14/2015     Osteoarthrosis, pelvic region and thigh 05/17/2013       Current Outpatient Medications   Medication Sig Dispense Refill     amLODIPine (NORVASC) 10 MG tablet Take 1 tablet (10 mg total) by mouth daily. 90 tablet 3     atorvastatin (LIPITOR) 10 MG tablet Take 1 tablet (10 mg total) by mouth daily. 30 tablet 2     blood glucose meter (GLUCOMETER) Use 1 each As Directed as needed. Dispense glucometer brand per  patient's insurance at pharmacy discretion. 1 each 0     blood glucose test (GLUCOSE BLOOD) strips Test blood sugars twice daily. Dx code: E11.9 100 strip 5     diclofenac (VOLTAREN) 75 MG EC tablet Take 1 tablet (75 mg total) by mouth 2 (two) times a day. 60 tablet 2     docusate sodium (COLACE) 100 MG capsule Take 1 capsule (100 mg total) by mouth daily as needed for constipation. 30 capsule 2     generic lancets Test one time per day. Dispense brand per patient's insurance at pharmacy discretion. 100 each 11     LANCETS MISC Use As Directed daily.       lancing device Misc Use as directed to test blood sugars twice daily. Dispense brand per patient's insurance at pharmacy discretion. 1 each 0     lisinopril (PRINIVIL,ZESTRIL) 40 MG tablet Take 1 tablet (40 mg total) by mouth daily. 90 tablet 3     metFORMIN (GLUCOPHAGE) 500 MG tablet Take 2 tablets (1,000 mg total) by mouth 2 (two) times a day with meals. 120 tablet 3     polyethylene glycol (GLYCOLAX) 17 gram/dose powder 17 gm in 8 oz water daily 510 g 6     sildenafil, antihypertensive, (REVATIO) 20 mg tablet 2-3 po 1 hour prior to sex as needed 20 tablet 3     VENTOLIN HFA 90 mcg/actuation inhaler Inhale 2 puffs every 4 (four) hours.       tiZANidine (ZANAFLEX) 2 MG tablet 1 po three times a day as needed for back spasm. 40 tablet 1     No current facility-administered medications for this visit.        ROS:   10 point review of systems negative other than as outlined above    Objective:    /58 (Patient Site: Left Arm, Patient Position: Sitting, Cuff Size: Adult Large)   Pulse 72   Resp 16   Wt 208 lb (94.3 kg)   BMI 35.70 kg/m    Body mass index is 35.7 kg/m .      General appearance no acute distress    HEENT neck is negative oropharynx is clear pupils react normally    Lungs were diminished but no wheezing    Heart was regular rate in the 70s    Abdomen soft nontender, no masses.    Thoracic area tender in through the left thoracic area with some  tight muscles.    He does have some joint line pain from the knee as before no effusion    No significant edema    Results for orders placed or performed in visit on 12/05/18   Glycosylated Hemoglobin A1c   Result Value Ref Range    Hemoglobin A1c 10.5 (H) 3.5 - 6.0 %       Assessment:  1. Type 2 diabetes mellitus without complication, without long-term current use of insulin (H)  Glycosylated Hemoglobin A1c   2. Essential hypertension     3. Hyperlipidemia, unspecified hyperlipidemia type     4. Microalbuminuria     5. Bronchitis     6. Primary osteoarthritis of right knee  diclofenac (VOLTAREN) 75 MG EC tablet   7. Left-sided thoracic back pain, unspecified chronicity  tiZANidine (ZANAFLEX) 2 MG tablet   8. Diabetes mellitus (H)  metFORMIN (GLUCOPHAGE) 500 MG tablet     Diabetes mellitus suboptimal control increase metformin 500 mill grams 2 twice a day    Hypertension controlled    Hyperlipidemia has been controlled stay on the same atorvastatin.    Bronchitis with bronchospasm continue on the azithromycin and albuterol nebs.    Left thoracic strain/spasm use tizanidine 2 mg twice daily as needed    Microalbumin urea continue lisinopril    Plan: As outlined above recheck in 4 weeks.    Refill on his diclofenac for the arthritis    This transcription uses voice recognition software, which may contain typographical errors.

## 2021-06-23 NOTE — TELEPHONE ENCOUNTER
Refill Approved    Rx renewed per Medication Renewal Policy. Medication was last renewed on 10/10/18.    Molly Unger, [facility] Triage/Med Refill 2019     Requested Prescriptions   Pending Prescriptions Disp Refills     tiZANidine (ZANAFLEX) 2 MG tablet 40 tablet 1     Si po three times a day as needed for back spasm    There is no refill protocol information for this order        atorvastatin (LIPITOR) 10 MG tablet 30 tablet 2     Sig: Take 1 tablet (10 mg total) by mouth daily.    Statins Refill Protocol (Hmg CoA Reductase Inhibitors) Passed - 2019 10:26 AM       Passed - PCP or prescribing provider visit in past 12 months     Last office visit with prescriber/PCP: 2018 Ethan Colunga MD [place] same dept: 2018 Ethan Colunga MD OR same specialty: 2018 Ethan Colunga MD  Last physical: 2017 Last MTM visit: Visit date not found   Next visit within 3 mo: Visit date not found  Next physical within 3 mo: Visit date not found  Prescriber OR PCP: Ethan Colunga MD  Last diagnosis associated with med order: 1. Left-sided thoracic back pain, unspecified chronicity  - tiZANidine (ZANAFLEX) 2 MG tablet; 1 po three times a day as needed for back spasm  Dispense: 40 tablet; Refill: 1    2. Type 2 diabetes mellitus without complication, without long-term current use of insulin  - atorvastatin (LIPITOR) 10 MG tablet; Take 1 tablet (10 mg total) by mouth daily.  Dispense: 30 tablet; Refill: 2    If protocol passes may refill for 12 months if within 3 months of last provider visit (or a total of 15 months).

## 2021-06-23 NOTE — TELEPHONE ENCOUNTER
RN cannot approve Refill Request    RN can NOT refill this medication med is not covered by policy/route to provider.         Molly Unger, [facility] Triage/Med Refill 2019    Requested Prescriptions   Pending Prescriptions Disp Refills     tiZANidine (ZANAFLEX) 2 MG tablet 40 tablet 1     Si po three times a day as needed for back spasm    There is no refill protocol information for this order      Signed Prescriptions Disp Refills     atorvastatin (LIPITOR) 10 MG tablet 30 tablet 2     Sig: Take 1 tablet (10 mg total) by mouth daily.    Statins Refill Protocol (Hmg CoA Reductase Inhibitors) Passed - 2019 10:26 AM       Passed - PCP or prescribing provider visit in past 12 months     Last office visit with prescriber/PCP: 2018 Ethan Colunga MD OR same dept: 2018 Ethan Colunga MD OR same specialty: 2018 Ethan Colunga MD  Last physical: 2017 Last MTM visit: Visit date not found   Next visit within 3 mo: Visit date not found  Next physical within 3 mo: Visit date not found  Prescriber OR PCP: Ethan Colunga MD  Last diagnosis associated with med order: 1. Left-sided thoracic back pain, unspecified chronicity  - tiZANidine (ZANAFLEX) 2 MG tablet; 1 po three times a day as needed for back spasm  Dispense: 40 tablet; Refill: 1    2. Type 2 diabetes mellitus without complication, without long-term current use of insulin  - atorvastatin (LIPITOR) 10 MG tablet; Take 1 tablet (10 mg total) by mouth daily.  Dispense: 30 tablet; Refill: 2    If protocol passes may refill for 12 months if within 3 months of last provider visit (or a total of 15 months).

## 2021-06-23 NOTE — TELEPHONE ENCOUNTER
Refill Request  Did you contact pharmacy: No.  Patient was informed to call the pharmacy. Writer assisted patient to discern his medication list He is coming to clinic on  for office visit.  These medication below do not have refills.  The rest of his medication he will call pharmacy.    Medication name:   Requested Prescriptions     Pending Prescriptions Disp Refills     tiZANidine (ZANAFLEX) 2 MG tablet 40 tablet 1     Si po three times a day as needed for back spasm     atorvastatin (LIPITOR) 10 MG tablet 30 tablet 2     Sig: Take 1 tablet (10 mg total) by mouth daily.     Who prescribed the medication:Dr Colunga  Pharmacy Name and Location: [facility]  Is patient out of medication: Yes  Patient notified refills processed in 72 hours:  no  Okay to leave a detailed message: no

## 2021-06-24 NOTE — TELEPHONE ENCOUNTER
Refill Approved    Rx renewed per Medication Renewal Policy. Medication was last renewed on 12/5/18.    Molly Unger, [facility] Triage/Med Refill 2/27/2019     Requested Prescriptions   Pending Prescriptions Disp Refills     metFORMIN (GLUCOPHAGE) 500 MG tablet 360 tablet 1     Sig: Take 2 tablets (1,000 mg total) by mouth 2 (two) times a day with meals.    Metformin Refill Protocol Passed - 2/27/2019  1:34 PM       Passed - Blood pressure in last 12 months    BP Readings from Last 1 Encounters:   12/05/18 130/58            Passed - LFT or AST or ALT in last 12 months    Albumin   Date Value Ref Range Status   07/31/2018 3.8 3.5 - 5.0 g/dL Final     Bilirubin, Total   Date Value Ref Range Status   07/31/2018 0.6 0.0 - 1.0 mg/dL Final     Bilirubin, Direct   Date Value Ref Range Status   06/22/2016 0.2 <=0.5 mg/dL Final     Alkaline Phosphatase   Date Value Ref Range Status   07/31/2018 69 45 - 120 U/L Final     AST   Date Value Ref Range Status   07/31/2018 54 (H) 0 - 40 U/L Final     ALT   Date Value Ref Range Status   07/31/2018 51 (H) 0 - 45 U/L Final     Protein, Total   Date Value Ref Range Status   07/31/2018 7.4 6.0 - 8.0 g/dL Final               Passed - GFR or Serum Creatinine in last 6 months    GFR MDRD Non Af Amer   Date Value Ref Range Status   07/31/2018 >60 >60 mL/min/1.73m2 Final     GFR MDRD Af Amer   Date Value Ref Range Status   07/31/2018 >60 >60 mL/min/1.73m2 Final            Passed - Visit with PCP or prescribing provider visit in last 6 months or next 3 months    Last office visit with prescriber/PCP: 12/5/2018 OR same dept: 12/5/2018 Ethan Colunga MD OR same specialty: 12/5/2018 Ethan Colunga MD Last physical: Visit date not found Last MTM visit: Visit date not found         Next appt within 3 mo: Visit date not found  Next physical within 3 mo: Visit date not found  Prescriber OR PCP: Ethan Colunga MD  Last diagnosis associated with med order: 1. Diabetes mellitus (H)  -  metFORMIN (GLUCOPHAGE) 500 MG tablet; Take 2 tablets (1,000 mg total) by mouth 2 (two) times a day with meals.  Dispense: 360 tablet; Refill: 1     If protocol passes may refill for 12 months if within 3 months of last provider visit (or a total of 15 months).          Passed - A1C in last 6 months    Hemoglobin A1c   Date Value Ref Range Status   12/05/2018 10.5 (H) 3.5 - 6.0 % Final              Passed - Microalbumin in last year     Microalbumin, Random Urine   Date Value Ref Range Status   07/31/2018 2.01 (H) 0.00 - 1.99 mg/dL Final

## 2021-06-25 NOTE — TELEPHONE ENCOUNTER
RN cannot approve Refill Request    RN can NOT refill this medication PCP messaged that patient is overdue for Office Visit. Last office visit: 11/25/2019 Ethan Colunga MD Last Physical: 6/6/2017 Last MTM visit: Visit date not found Last visit same specialty: 11/25/2019 Ethan Colunga MD.  Next visit within 3 mo: Visit date not found  Next physical within 3 mo: Visit date not found      Divya Urias, Care Connection Triage/Med Refill 6/13/2021    Requested Prescriptions   Pending Prescriptions Disp Refills     albuterol (PROAIR HFA;PROVENTIL HFA;VENTOLIN HFA) 90 mcg/actuation inhaler [Pharmacy Med Name: ALBUTEROL HFA (PROVENTIL) INH] 6.7 Inhaler 1     Sig: INHALE 2 PUFFS FOUR TIMES A DAY AS NEEDED FOR WHEEZING       Albuterol/Levalbuterol Refill Protocol Passed - 6/13/2021  5:28 PM        Passed - PCP or prescribing provider visit in last year     Last office visit with prescriber/PCP: 11/25/2019 Ethan Colunga MD OR same dept: Visit date not found OR same specialty: 11/25/2019 Ethan Colunga MD Last physical: 6/6/2017       Next appt within 3 mo: Visit date not found  Next physical within 3 mo: Visit date not found  Prescriber OR PCP: Ethan Colunga MD  Last diagnosis associated with med order: 1. Bronchospasm  - albuterol (PROAIR HFA;PROVENTIL HFA;VENTOLIN HFA) 90 mcg/actuation inhaler [Pharmacy Med Name: ALBUTEROL HFA (PROVENTIL) INH]; INHALE 2 PUFFS FOUR TIMES A DAY AS NEEDED FOR WHEEZING  Dispense: 6.7 Inhaler; Refill: 1    2. Type 2 diabetes mellitus without complication, without long-term current use of insulin  - atorvastatin (LIPITOR) 10 MG tablet [Pharmacy Med Name: ATORVASTATIN 10 MG TABLET]; TAKE 1 TABLET BY MOUTH EVERY DAY  Dispense: 90 tablet; Refill: 0    3. HTN (hypertension)  - amLODIPine (NORVASC) 10 MG tablet [Pharmacy Med Name: AMLODIPINE BESYLATE 10 MG TAB]; TAKE 1 TABLET BY MOUTH EVERY DAY  Dispense: 90 tablet; Refill: 0    If protocol passes may refill for 6 months if within 3  months of last provider visit (or a total of 9 months). If patient requesting >1 inhaler per month refill x 6 months and have patient make appointment with provider.             atorvastatin (LIPITOR) 10 MG tablet [Pharmacy Med Name: ATORVASTATIN 10 MG TABLET] 90 tablet 0     Sig: TAKE 1 TABLET BY MOUTH EVERY DAY       Statins Refill Protocol (Hmg CoA Reductase Inhibitors) Passed - 6/13/2021  5:28 PM        Passed - PCP or prescribing provider visit in past 12 months      Last office visit with prescriber/PCP: 11/25/2019 Ethan Colunga MD OR same dept: Visit date not found OR same specialty: 11/25/2019 Ethan Colunga MD  Last physical: 6/6/2017 Last MTM visit: Visit date not found   Next visit within 3 mo: Visit date not found  Next physical within 3 mo: Visit date not found  Prescriber OR PCP: Ethan Colunga MD  Last diagnosis associated with med order: 1. Bronchospasm  - albuterol (PROAIR HFA;PROVENTIL HFA;VENTOLIN HFA) 90 mcg/actuation inhaler [Pharmacy Med Name: ALBUTEROL HFA (PROVENTIL) INH]; INHALE 2 PUFFS FOUR TIMES A DAY AS NEEDED FOR WHEEZING  Dispense: 6.7 Inhaler; Refill: 1    2. Type 2 diabetes mellitus without complication, without long-term current use of insulin  - atorvastatin (LIPITOR) 10 MG tablet [Pharmacy Med Name: ATORVASTATIN 10 MG TABLET]; TAKE 1 TABLET BY MOUTH EVERY DAY  Dispense: 90 tablet; Refill: 0    3. HTN (hypertension)  - amLODIPine (NORVASC) 10 MG tablet [Pharmacy Med Name: AMLODIPINE BESYLATE 10 MG TAB]; TAKE 1 TABLET BY MOUTH EVERY DAY  Dispense: 90 tablet; Refill: 0    If protocol passes may refill for 12 months if within 3 months of last provider visit (or a total of 15 months).                amLODIPine (NORVASC) 10 MG tablet [Pharmacy Med Name: AMLODIPINE BESYLATE 10 MG TAB] 90 tablet 0     Sig: TAKE 1 TABLET BY MOUTH EVERY DAY       Calcium-Channel Blockers Protocol Failed - 6/13/2021  5:28 PM        Failed - Blood pressure filed in past 12 months     BP Readings from  Last 1 Encounters:   11/25/19 116/58             Passed - PCP or prescribing provider visit in past 12 months or next 3 months     Last office visit with prescriber/PCP: 11/25/2019 Ethan Colunga MD OR same dept: Visit date not found OR same specialty: 11/25/2019 Ethan Colunga MD  Last physical: 6/6/2017 Last MTM visit: Visit date not found   Next visit within 3 mo: Visit date not found  Next physical within 3 mo: Visit date not found  Prescriber OR PCP: Ethan Colunga MD  Last diagnosis associated with med order: 1. Bronchospasm  - albuterol (PROAIR HFA;PROVENTIL HFA;VENTOLIN HFA) 90 mcg/actuation inhaler [Pharmacy Med Name: ALBUTEROL HFA (PROVENTIL) INH]; INHALE 2 PUFFS FOUR TIMES A DAY AS NEEDED FOR WHEEZING  Dispense: 6.7 Inhaler; Refill: 1    2. Type 2 diabetes mellitus without complication, without long-term current use of insulin  - atorvastatin (LIPITOR) 10 MG tablet [Pharmacy Med Name: ATORVASTATIN 10 MG TABLET]; TAKE 1 TABLET BY MOUTH EVERY DAY  Dispense: 90 tablet; Refill: 0    3. HTN (hypertension)  - amLODIPine (NORVASC) 10 MG tablet [Pharmacy Med Name: AMLODIPINE BESYLATE 10 MG TAB]; TAKE 1 TABLET BY MOUTH EVERY DAY  Dispense: 90 tablet; Refill: 0    If protocol passes may refill for 12 months if within 3 months of last provider visit (or a total of 15 months).

## 2021-07-04 NOTE — ADDENDUM NOTE
Addendum Note by Neena Metzger at 2/9/2021  2:20 PM     Author: Neena Metzger Service: -- Author Type: --    Filed: 2/19/2021  1:10 PM Encounter Date: 2/9/2021 Status: Signed    : Neena Metzger    Addended by: NEENA METZGER on: 2/19/2021 01:10 PM        Modules accepted: Orders

## 2021-07-07 NOTE — TELEPHONE ENCOUNTER
Telephone Encounter by Dafne Spence RN at 7/7/2021  8:16 AM     Author: Dafne Spence RN Service: -- Author Type: Registered Nurse    Filed: 7/7/2021  8:18 AM Encounter Date: 7/7/2021 Status: Signed    : Dafne Spence RN (Registered Nurse)       Refill Approved  LOV 2/9/2021  Rx renewed per Medication Renewal Policy. Medication was last renewed on 6/1/2021.    Dafne Spence, [facility] Triage/Med Refill 7/7/2021     Requested Prescriptions   Pending Prescriptions Disp Refills   ? albuterol (PROAIR HFA;PROVENTIL HFA;VENTOLIN HFA) 90 mcg/actuation inhaler [Pharmacy Med Name: ALBUTEROL HFA (PROVENTIL) INH] 6.7 Inhaler 2     Sig: INHALE 2 PUFFS BY MOUTH FOUR TIMES A DAY AS NEEDED WHEEZING       Albuterol/Levalbuterol Refill Protocol Passed - 7/7/2021 12:21 AM        Passed - PCP or prescribing provider visit in last year     Last office visit with prescriber/PCP: 11/25/2019 Ethan Colunga MD OR same dept: Visit date not found OR same specialty: 11/25/2019 Ethan Colunga MD Last physical: 6/6/2017       Next appt within 3 mo: Visit date not found  Next physical within 3 mo: Visit date not found  Prescriber OR PCP: Ethan Colunga MD  Last diagnosis associated with med order: 1. Bronchospasm  - albuterol (PROAIR HFA;PROVENTIL HFA;VENTOLIN HFA) 90 mcg/actuation inhaler [Pharmacy Med Name: ALBUTEROL HFA (PROVENTIL) INH]; INHALE 2 PUFFS BY MOUTH FOUR TIMES A DAY AS NEEDED WHEEZING  Dispense: 6.7 Inhaler; Refill: 2    If protocol passes may refill for 6 months if within 3 months of last provider visit (or a total of 9 months). If patient requesting >1 inhaler per month refill x 6 months and have patient make appointment with provider.

## 2021-07-11 NOTE — TELEPHONE ENCOUNTER
RN cannot approve Refill Request    RN can NOT refill this medication Protocol failed and NO refill given. Last office visit: 2/09/21 Ethan Colunga MD Last Physical: 6/6/2017 Last MTM visit: Visit date not found Last visit same specialty: 11/25/2019 Ethan Colunga MD.  Next visit within 3 mo: Visit date not found  Next physical within 3 mo: Visit date not found      Nav Felton, [facility] Triage/Med Refill 7/11/2021    Requested Prescriptions   Pending Prescriptions Disp Refills     atorvastatin (LIPITOR) 10 MG tablet [Pharmacy Med Name: ATORVASTATIN 10 MG TABLET] 30 tablet 0     Sig: TAKE 1 TABLET BY MOUTH EVERY DAY       Statins Refill Protocol (Hmg CoA Reductase Inhibitors) Passed - 7/10/2021 11:33 AM        Passed - PCP or prescribing provider visit in past 12 months      Last office visit with prescriber/PCP: 11/25/2019 Ethan Colunga MD OR same dept: Visit date not found OR same specialty: 11/25/2019 Ethan Colunga MD  Last physical: 6/6/2017 Last MTM visit: Visit date not found   Next visit within 3 mo: Visit date not found  Next physical within 3 mo: Visit date not found  Prescriber OR PCP: Ethan Colunga MD  Last diagnosis associated with med order: 1. Type 2 diabetes mellitus without complication, without long-term current use of insulin  - atorvastatin (LIPITOR) 10 MG tablet [Pharmacy Med Name: ATORVASTATIN 10 MG TABLET]; TAKE 1 TABLET BY MOUTH EVERY DAY  Dispense: 30 tablet; Refill: 0    2. HTN (hypertension)  - amLODIPine (NORVASC) 10 MG tablet [Pharmacy Med Name: AMLODIPINE BESYLATE 10 MG TAB]; TAKE 1 TABLET BY MOUTH EVERY DAY  Dispense: 30 tablet; Refill: 0    If protocol passes may refill for 12 months if within 3 months of last provider visit (or a total of 15 months).                amLODIPine (NORVASC) 10 MG tablet [Pharmacy Med Name: AMLODIPINE BESYLATE 10 MG TAB] 30 tablet 0     Sig: TAKE 1 TABLET BY MOUTH EVERY DAY       Calcium-Channel Blockers Protocol Failed -  7/10/2021 11:33 AM        Failed - Blood pressure filed in past 12 months     BP Readings from Last 1 Encounters:   11/25/19 116/58             Passed - PCP or prescribing provider visit in past 12 months or next 3 months     Last office visit with prescriber/PCP: 11/25/2019 Ethan Colunga MD OR same dept: Visit date not found OR same specialty: 11/25/2019 Ethan Colunga MD  Last physical: 6/6/2017 Last MTM visit: Visit date not found   Next visit within 3 mo: Visit date not found  Next physical within 3 mo: Visit date not found  Prescriber OR PCP: Ethan Colunga MD  Last diagnosis associated with med order: 1. Type 2 diabetes mellitus without complication, without long-term current use of insulin  - atorvastatin (LIPITOR) 10 MG tablet [Pharmacy Med Name: ATORVASTATIN 10 MG TABLET]; TAKE 1 TABLET BY MOUTH EVERY DAY  Dispense: 30 tablet; Refill: 0    2. HTN (hypertension)  - amLODIPine (NORVASC) 10 MG tablet [Pharmacy Med Name: AMLODIPINE BESYLATE 10 MG TAB]; TAKE 1 TABLET BY MOUTH EVERY DAY  Dispense: 30 tablet; Refill: 0    If protocol passes may refill for 12 months if within 3 months of last provider visit (or a total of 15 months).                     As part of the required manual data conversion process for integration, this encounter was created to document a refill encounter. This information was copied from the [facility] patient's chart to the [facility] patient chart.     Nav Felton

## 2021-07-11 NOTE — TELEPHONE ENCOUNTER
Telephone Encounter by Elysia Donahue RN at 7/11/2021  4:41 PM     Author: Elysia Donahue RN Service: -- Author Type: Registered Nurse    Filed: 7/11/2021  4:41 PM Encounter Date: 7/10/2021 Status: Signed    : Elysia Donahue RN (Registered Nurse)       RN cannot approve Refill Request    RN can NOT refill this medication Protocol failed and NO refill given. Last office visit: 2/9/2021 Last Physical: Visit date not found Last MTM visit: Visit date not found Last visit same specialty: 11/25/2019 Ethan Colunga MD.  Next visit within 3 mo: Visit date not found  Next physical within 3 mo: Visit date not found      Lucía Rivas Connection Triage/Med Refill 7/11/2021    Requested Prescriptions   Pending Prescriptions Disp Refills   ? metFORMIN (GLUCOPHAGE) 500 MG tablet [Pharmacy Med Name: METFORMIN  MG TABLET] 120 tablet 0     Sig: TAKE 2 TABLETS BY MOUTH TWICE A DAY WITH MEALS (NEED LABS BEFORE REFILL)       Metformin Refill Protocol Failed - 7/10/2021  8:33 AM        Failed - Blood pressure in last 12 months     BP Readings from Last 1 Encounters:   11/25/19 116/58             Failed - LFT or AST or ALT in last 12 months     Albumin   Date Value Ref Range Status   08/21/2019 3.9 3.5 - 5.0 g/dL Final     Bilirubin, Total   Date Value Ref Range Status   08/21/2019 0.4 0.0 - 1.0 mg/dL Final     Bilirubin, Direct   Date Value Ref Range Status   06/22/2016 0.2 <=0.5 mg/dL Final     Alkaline Phosphatase   Date Value Ref Range Status   08/21/2019 88 45 - 120 U/L Final     AST   Date Value Ref Range Status   08/21/2019 23 0 - 40 U/L Final     ALT   Date Value Ref Range Status   08/21/2019 31 0 - 45 U/L Final     Protein, Total   Date Value Ref Range Status   08/21/2019 7.6 6.0 - 8.0 g/dL Final                Failed - GFR or Serum Creatinine in last 6 months     GFR MDRD Non Af Amer   Date Value Ref Range Status   08/21/2019 >60 >60 mL/min/1.73m2 Final     GFR MDRD Af Amer   Date Value Ref Range  Status   08/21/2019 >60 >60 mL/min/1.73m2 Final             Failed - A1C in last 6 months     Hemoglobin A1c   Date Value Ref Range Status   11/25/2019 7.8 (H) 3.5 - 6.0 % Final               Failed - Microalbumin in last year      Microalbumin, Random Urine   Date Value Ref Range Status   07/31/2018 2.01 (H) 0.00 - 1.99 mg/dL Final                  Passed - Visit with PCP or prescribing provider visit in last 6 months or next 3 months     Last office visit with prescriber/PCP: Visit date not found OR same dept: Visit date not found OR same specialty: 11/25/2019 Ethan Colunga MD Last physical: Visit date not found Last MTM visit: Visit date not found         Next appt within 3 mo: Visit date not found  Next physical within 3 mo: Visit date not found  Prescriber OR PCP: Tracy Mahan PA-C  Last diagnosis associated with med order: 1. Diabetes mellitus (H)  - metFORMIN (GLUCOPHAGE) 500 MG tablet [Pharmacy Med Name: METFORMIN  MG TABLET]; TAKE 2 TABLETS BY MOUTH TWICE A DAY WITH MEALS (NEED LABS BEFORE REFILL)  Dispense: 120 tablet; Refill: 0     If protocol passes may refill for 12 months if within 3 months of last provider visit (or a total of 15 months).                   As part of the required manual data conversion process for integration, this encounter was created to document a refill encounter. This information was copied from the [facility] patient's chart to the [facility] patient chart.     Elysia Donahue

## 2021-07-11 NOTE — TELEPHONE ENCOUNTER
Telephone Encounter by Adwoa Monae RN at 7/11/2021  6:04 PM     Author: Adwoa Monae RN Service: -- Author Type: Registered Nurse    Filed: 7/11/2021  6:04 PM Encounter Date: 7/10/2021 Status: Signed    : Adwoa Monae RN (Registered Nurse)       RN cannot approve Refill Request    RN can NOT refill this medication Protocol failed and NO refill given. Last office visit: 2/8/21   Ethan Colunga MD Last Physical: 6/6/2017 Last MTM visit: Visit date not found Last visit same specialty: 11/25/2019 Ethan Colunga MD.  Next visit within 3 mo: Visit date not found  Next physical within 3 mo: Visit date not found      Nav Felton [facility] Connection Triage/Med Refill 7/11/2021    Requested Prescriptions   Pending Prescriptions Disp Refills   ? atorvastatin (LIPITOR) 10 MG tablet [Pharmacy Med Name: ATORVASTATIN 10 MG TABLET] 30 tablet 0     Sig: TAKE 1 TABLET BY MOUTH EVERY DAY       Statins Refill Protocol (Hmg CoA Reductase Inhibitors) Passed - 7/10/2021 11:33 AM        Passed - PCP or prescribing provider visit in past 12 months      Last office visit with prescriber/PCP: 11/25/2019 Ethan Colunga MD OR same dept: Visit date not found OR same specialty: 11/25/2019 Ethan Colunga MD  Last physical: 6/6/2017 Last MTM visit: Visit date not found   Next visit within 3 mo: Visit date not found  Next physical within 3 mo: Visit date not found  Prescriber OR PCP: Ethan Colunga MD  Last diagnosis associated with med order: 1. Type 2 diabetes mellitus without complication, without long-term current use of insulin  - atorvastatin (LIPITOR) 10 MG tablet [Pharmacy Med Name: ATORVASTATIN 10 MG TABLET]; TAKE 1 TABLET BY MOUTH EVERY DAY  Dispense: 30 tablet; Refill: 0    2. HTN (hypertension)  - amLODIPine (NORVASC) 10 MG tablet [Pharmacy Med Name: AMLODIPINE BESYLATE 10 MG TAB]; TAKE 1 TABLET BY MOUTH EVERY DAY  Dispense: 30 tablet; Refill: 0    If protocol passes may refill for 12 months if within 3  months of last provider visit (or a total of 15 months).              ? amLODIPine (NORVASC) 10 MG tablet [Pharmacy Med Name: AMLODIPINE BESYLATE 10 MG TAB] 30 tablet 0     Sig: TAKE 1 TABLET BY MOUTH EVERY DAY       Calcium-Channel Blockers Protocol Failed - 7/10/2021 11:33 AM        Failed - Blood pressure filed in past 12 months     BP Readings from Last 1 Encounters:   11/25/19 116/58             Passed - PCP or prescribing provider visit in past 12 months or next 3 months     Last office visit with prescriber/PCP: 11/25/2019 Ethan Colunga MD OR same dept: Visit date not found OR same specialty: 11/25/2019 Ethan Colunga MD  Last physical: 6/6/2017 Last MTM visit: Visit date not found   Next visit within 3 mo: Visit date not found  Next physical within 3 mo: Visit date not found  Prescriber OR PCP: Ethan Colunga MD  Last diagnosis associated with med order: 1. Type 2 diabetes mellitus without complication, without long-term current use of insulin  - atorvastatin (LIPITOR) 10 MG tablet [Pharmacy Med Name: ATORVASTATIN 10 MG TABLET]; TAKE 1 TABLET BY MOUTH EVERY DAY  Dispense: 30 tablet; Refill: 0    2. HTN (hypertension)  - amLODIPine (NORVASC) 10 MG tablet [Pharmacy Med Name: AMLODIPINE BESYLATE 10 MG TAB]; TAKE 1 TABLET BY MOUTH EVERY DAY  Dispense: 30 tablet; Refill: 0    If protocol passes may refill for 12 months if within 3 months of last provider visit (or a total of 15 months).                     As part of the required manual data conversion process for integration, this encounter was created to document a refill encounter. This information was copied from the [facility] patient's chart to the [facility] patient chart.     Nav Felton

## 2021-07-11 NOTE — TELEPHONE ENCOUNTER
RN cannot approve Refill Request    RN can NOT refill this medication Protocol failed and NO refill given. Last office visit: 2/9/2021 Last Physical: Visit date not found Last MTM visit: Visit date not found Last visit same specialty: 11/25/2019 Ethan Colunga MD.  Next visit within 3 mo: Visit date not found  Next physical within 3 mo: Visit date not found      Elysia Donahue, Care Connection Triage/Med Refill 7/11/2021    Requested Prescriptions   Pending Prescriptions Disp Refills     metFORMIN (GLUCOPHAGE) 500 MG tablet [Pharmacy Med Name: METFORMIN  MG TABLET] 120 tablet 0     Sig: TAKE 2 TABLETS BY MOUTH TWICE A DAY WITH MEALS (NEED LABS BEFORE REFILL)       Metformin Refill Protocol Failed - 7/10/2021  8:33 AM        Failed - Blood pressure in last 12 months     BP Readings from Last 1 Encounters:   11/25/19 116/58             Failed - LFT or AST or ALT in last 12 months     Albumin   Date Value Ref Range Status   08/21/2019 3.9 3.5 - 5.0 g/dL Final     Bilirubin, Total   Date Value Ref Range Status   08/21/2019 0.4 0.0 - 1.0 mg/dL Final     Bilirubin, Direct   Date Value Ref Range Status   06/22/2016 0.2 <=0.5 mg/dL Final     Alkaline Phosphatase   Date Value Ref Range Status   08/21/2019 88 45 - 120 U/L Final     AST   Date Value Ref Range Status   08/21/2019 23 0 - 40 U/L Final     ALT   Date Value Ref Range Status   08/21/2019 31 0 - 45 U/L Final     Protein, Total   Date Value Ref Range Status   08/21/2019 7.6 6.0 - 8.0 g/dL Final                Failed - GFR or Serum Creatinine in last 6 months     GFR MDRD Non Af Amer   Date Value Ref Range Status   08/21/2019 >60 >60 mL/min/1.73m2 Final     GFR MDRD Af Amer   Date Value Ref Range Status   08/21/2019 >60 >60 mL/min/1.73m2 Final             Failed - A1C in last 6 months     Hemoglobin A1c   Date Value Ref Range Status   11/25/2019 7.8 (H) 3.5 - 6.0 % Final               Failed - Microalbumin in last year      Microalbumin, Random Urine   Date  Value Ref Range Status   07/31/2018 2.01 (H) 0.00 - 1.99 mg/dL Final                  Passed - Visit with PCP or prescribing provider visit in last 6 months or next 3 months     Last office visit with prescriber/PCP: Visit date not found OR same dept: Visit date not found OR same specialty: 11/25/2019 Ethan Colunga MD Last physical: Visit date not found Last MTM visit: Visit date not found         Next appt within 3 mo: Visit date not found  Next physical within 3 mo: Visit date not found  Prescriber OR PCP: Tracy Mahan PA-C  Last diagnosis associated with med order: 1. Diabetes mellitus (H)  - metFORMIN (GLUCOPHAGE) 500 MG tablet [Pharmacy Med Name: METFORMIN  MG TABLET]; TAKE 2 TABLETS BY MOUTH TWICE A DAY WITH MEALS (NEED LABS BEFORE REFILL)  Dispense: 120 tablet; Refill: 0     If protocol passes may refill for 12 months if within 3 months of last provider visit (or a total of 15 months).                   As part of the required manual data conversion process for integration, this encounter was created to document a refill encounter. This information was copied from the [facility] patient's chart to the [facility] patient chart.     Elysia Donahue

## 2021-07-20 NOTE — PROGRESS NOTES
[facility] Care Coordination Contact      [facility] Six-Month Telephone Assessment    6 month telephone assessment completed on 7/20/21.    ER visits: No  Hospitalizations: No  TCU stays: No  Significant health status changes: none  Falls/Injuries: No  ADL/IADL changes: No  Changes in services: No    Caregiver Assessment follow up:  Care Coordinator called Lalit for his 6 month interim assessment.  He shared he is doing well and has no questions, concerns or needs.  He reports no hospital or ER visits and no falls.  He is happy with his services.      Goals: See POC in chart for goal progress documentation.      Will see member in 6 months for an annual health risk assessment.   Encouraged member to call CC with any questions or concerns in the meantime.       TIEN Saldivar  [facility]  [phone]

## 2021-07-31 NOTE — TELEPHONE ENCOUNTER
"Routing refill request to provider for review/approval because:  BP, Cr, and LDL labs due      ___________________________________________________________    Copy of Last Rx:          Last office visit with provider:  2/9/21   ___________________________________________________________    Requested Prescriptions   Pending Prescriptions Disp Refills     amLODIPine (NORVASC) 10 MG tablet 90 tablet 0     Sig: Take 1 tablet (10 mg) by mouth daily       Calcium Channel Blockers Protocol  Failed - 7/27/2021 11:39 AM        Failed - Blood pressure under 140/90 in past 12 months     BP Readings from Last 3 Encounters:   11/25/19 116/58   01/15/15 138/74                 Failed - Normal serum creatinine on file in past 12 months     Recent Labs   Lab Test 08/21/19  1443   CR 1.06       Ok to refill medication if creatinine is low          Passed - Recent (12 mo) or future (30 days) visit within the authorizing provider's specialty     Patient has had an office visit with the authorizing provider or a provider within the authorizing providers department within the previous 12 mos or has a future within next 30 days. See \"Patient Info\" tab in inbasket, or \"Choose Columns\" in Meds & Orders section of the refill encounter.              Passed - Medication is active on med list        Passed - Patient is age 18 or older           atorvastatin (LIPITOR) 10 MG tablet 90 tablet 0     Sig: Take 1 tablet (10 mg) by mouth daily       Statins Protocol Failed - 7/27/2021 11:39 AM        Failed - LDL on file in past 12 months     Recent Labs   Lab Test 08/21/19  1443   LDL 66             Passed - No abnormal creatine kinase in past 12 months     No lab results found.             Passed - Recent (12 mo) or future (30 days) visit within the authorizing provider's specialty     Patient has had an office visit with the authorizing provider or a provider within the authorizing providers department within the previous 12 mos or has a future " "within next 30 days. See \"Patient Info\" tab in inbasket, or \"Choose Columns\" in Meds & Orders section of the refill encounter.              Passed - Medication is active on med list        Passed - Patient is age 18 or older             Analilia Mcneill RN 07/31/21 11:38 AM  "

## 2021-08-16 NOTE — TELEPHONE ENCOUNTER
Pending Prescriptions:                       Disp   Refills    lisinopril (ZESTRIL) 20 MG tablet         30 tab*1            Sig: Take 1 tablet (20 mg) by mouth daily

## 2021-08-24 PROBLEM — E11.29 TYPE 2 DIABETES MELLITUS WITH OTHER DIABETIC KIDNEY COMPLICATION (H): Status: ACTIVE | Noted: 2021-01-01

## 2021-08-24 PROBLEM — E11.9 DIABETES MELLITUS, TYPE 2 (H): Status: ACTIVE | Noted: 2021-01-01

## 2021-08-24 NOTE — PROGRESS NOTES
"SUBJECTIVE:   Lalit Geiger is a 66 year old male who presents for Preventive Visit.    This patient has ongoing pain in his hips and also gets some tingling in his fingertips    He has not been in since November 2019 his last A1c was 7.8 he is on Metformin 500 mg 2 twice a day.    Patient takes lisinopril 40 mg a day atorvastatin 10 mg a day amlodipine 10 mg a day aspirin 81 mg a day.    He is due to see eye doctor in follow-up later this afternoon he states.    Patient gets up 1-2 times at night.  He agreed to a PSA level but did not want a rectal check    Agreed to a Cologuard but did not want a colonoscopy.    He does have some wheezing and uses intermittent albuterol.    His energy is about the same but does get tired at times no chest pain no shortness of breath.    Decreased vision as outlined below.    He sees no other physicians.      Patient has been advised of split billing requirements and indicates understanding: Yes   Are you in the first 12 months of your Medicare coverage?  Yes,  Visual Acuity:  Right Eye: 10/32   Left Eye: 10/32  Both Eyes: 10/32    Healthy Habits:     In general, how would you rate your overall health?  Fair    Frequency of exercise:  1 day/week    Duration of exercise:  15-30 minutes    Do you usually eat at least 4 servings of fruit and vegetables a day, include whole grains    & fiber and avoid regularly eating high fat or \"junk\" foods?  Yes    Taking medications regularly:  Yes    Medication side effects:  None    Ability to successfully perform activities of daily living:  Telephone requires assistance, transportation requires assistance, shopping requires assistance, preparing meals requires assistance, housework requires assistance, bathing requires assistance, laundry requires assistance, medication administration requires assistance and money management requires assistance    Home Safety:  No safety concerns identified    Hearing Impairment:  No hearing concerns    In the " past 6 months, have you been bothered by leaking of urine?  No    In general, how would you rate your overall mental or emotional health?  Fair      PHQ-2 Total Score: 0    Additional concerns today:  No    Do you feel safe in your environment? Yes    Have you ever done Advance Care Planning? (For example, a Health Directive, POLST, or a discussion with a medical provider or your loved ones about your wishes): Yes, advance care planning is on file.    No hearing concerns  Fall risk  Fallen 2 or more times in the past year?: No  Any fall with injury in the past year?: No    Cognitive Screening   1) Repeat 3 items (Leader, Season, Table)    2) Clock draw: NORMAL  3) 3 item recall: Recalls 3 objects  Results: 3 items recalled: COGNITIVE IMPAIRMENT LESS LIKELY  Mini cog 5/5  Mini-CogTM Copyright HERMAN Narayan. Licensed by the author for use in [facility]; reprinted with permission (zelalem@[email or web address]). All rights reserved.      Do you have sleep apnea, excessive snoring or daytime drowsiness?: no    Reviewed and updated as needed this visit by clinical staff  Tobacco  Allergies  Meds              Reviewed and updated as needed this visit by Provider                Social History     Tobacco Use     Smoking status: Current Some Day Smoker     Packs/day: 3.00     Types: Cigarettes, Cigarettes, Cigarettes     Start date: 6/25/2015     Smokeless tobacco: Never Used     Tobacco comment: Cousin smokes   Substance Use Topics     Alcohol use: Yes     Alcohol/week: 5.0 standard drinks     If you drink alcohol do you typically have >3 drinks per day or >7 drinks per week? Yes      Alcohol Use 8/24/2021   Prescreen: >3 drinks/day or >7 drinks/week? No   Prescreen: >3 drinks/day or >7 drinks/week? -         PROBLEMS TO ADD ON...    Current providers sharing in care for this patient include:   Patient Care Team:  Radha Tejada MD as PCP - General (Student in organized health care education/training program)  Chanelle  "MD Susana (Family Practice)  Maria Esther Singh LSW as Lead Care Coordinator (Primary Care - CC)  Ethan Colunga MD as Assigned PCP    The following health maintenance items are reviewed in Epic and correct as of today:  Health Maintenance Due   Topic Date Due     ANNUAL REVIEW OF  ORDERS  Never done     Pneumococcal Vaccine: 65+ Years (1 of 2 - PPSV23) Never done     COLORECTAL CANCER SCREENING  Never done     DTAP/TDAP/TD IMMUNIZATION (1 - Tdap) Never done     ZOSTER IMMUNIZATION (1 of 2) Never done     EYE EXAM  06/06/2018     AORTIC ANEURYSM SCREENING (SYSTEM ASSIGNED)  Never done     MICROALBUMIN  07/31/2019     A1C  05/25/2020     BMP  08/21/2020     LIPID  08/21/2020     COVID-19 Vaccine (2 - Pfizer 2-dose series) 08/27/2021             Review of Systems  10 point review of systems positive as outlined above otherwise negative    OBJECTIVE:   /70 (BP Location: Left arm, Patient Position: Sitting, Cuff Size: Adult Regular)   Pulse 67   Ht 1.622 m (5' 3.86\")   Wt 88 kg (194 lb)   BMI 33.45 kg/m   Estimated body mass index is 33.45 kg/m  as calculated from the following:    Height as of this encounter: 1.622 m (5' 3.86\").    Weight as of this encounter: 88 kg (194 lb).  Physical Exam  General appearance no acute distress  Weight elevated 33.5 BMI  Vital signs were stable    HEENT no adenopathy no bruit oropharynx is clear pupils react normally canals and TMs normal    Lungs are clear throughout no rales or rhonchi heart regular S1-S2.    Abdomen nontender, no mass    Deferred on rectal and genital exam per patient request.    Extremities normal pulses normal sensation to light touch.    Joints he complains of pain in through the right more than left hip with rotation    Skin is normal no rashes.    Lab work today microalbumin A1c CMP lipid CBC PSA    Cologuard referral    Patient is due for immunizations of Pneumovax Td and Shingrix but refuses today he said he get him down the " line.            ASSESSMENT / PLAN:       ICD-10-CM    1. Encounter for Medicare annual wellness exam  Z00.00    2. Essential hypertension  I10 Comprehensive metabolic panel (BMP + Alb, Alk Phos, ALT, AST, Total. Bili, TP)     lisinopril (ZESTRIL) 40 MG tablet     amLODIPine (NORVASC) 10 MG tablet   3. Morbid obesity (H)  E66.01    4. Microalbuminuria  R80.9 Albumin Random Urine Quantitative with Creat Ratio   5. Hyperlipidemia, unspecified hyperlipidemia type  E78.5 Lipid Profile (Chol, Trig, HDL, LDL calc)     atorvastatin (LIPITOR) 10 MG tablet   6. Other fatigue  R53.83 CBC with platelets   7. Type 2 diabetes mellitus with other diabetic kidney complication (H)  E11.29 Hemoglobin A1c     Comprehensive metabolic panel (BMP + Alb, Alk Phos, ALT, AST, Total. Bili, TP)     aspirin (ASA) 81 MG EC tablet     metFORMIN (GLUCOPHAGE) 500 MG tablet   8. Primary osteoarthritis of both hips  M16.0 acetaminophen (TYLENOL) 500 MG tablet   9. Wheezing  R06.2 albuterol (PROAIR HFA/PROVENTIL HFA/VENTOLIN HFA) 108 (90 Base) MCG/ACT inhaler   10. Nocturia  R35.1 PSA, tumor marker   11. Colon cancer screening  Z12.11 COLOGUARD(EX[employer] SCIENCES)     Annual wellness visit as discussed, is slowing down does get some joint pains.  He will use some acetaminophen.  He does not think the Celebrex was helpful    Nocturia we will check PSA marker did not want a rectal exam    Wheezing use as needed albuterol    Colon cancer screening check Cologuard    Diabetes mellitus checking blood sugars last A1c about a year and a half ago at 7.8 we will recheck that and microalbumin.    For blood pressure continue on the amlodipine and the lisinopril.    For lipids continue on atorvastatin 10 mg a day we will check labs    Due for immunizations with Pneumovax Td and Shingrix but patient refuses at this time.  He will be getting the second Covid shot soon.    Patient be contacted with the lab results and discuss follow-up    Meds were refilled for 90  "days with 1 refill        Patient has been advised of split billing requirements and indicates understanding: Yes      Estimated body mass index is 33.45 kg/m  as calculated from the following:    Height as of this encounter: 1.622 m (5' 3.86\").    Weight as of this encounter: 88 kg (194 lb).    Weight management plan: Discussed healthy diet and exercise guidelines          Appropriate preventive services were discussed with this patient, including applicable screening as appropriate for cardiovascular disease, diabetes, osteopenia/osteoporosis, and glaucoma.  As appropriate for age/gender, discussed screening for colorectal cancer, prostate cancer, breast cancer, and cervical cancer. Checklist reviewing preventive services available has been given to the patient.    Reviewed patients plan of care and provided an AVS.     Counseling Resources:  ATP IV Guidelines  Pooled Cohorts Equation Calculator  Breast Cancer Risk Calculator  Breast Cancer: Medication to Reduce Risk  FRAX Risk Assessment  ICSI Preventive Guidelines  Dietary Guidelines for Americans, 2010  USDA's MyPlate  ASA Prophylaxis  Lung CA Screening    Ethan Colunga MD  [facility]    Identified Health Risks:    Noncompliant with follow-ups regarding his diabetes hypertension hyperlipidemia.    Noncompliant with immunizations    Has been noncompliant with colon cancer screening.  "

## 2021-08-24 NOTE — PATIENT INSTRUCTIONS
Patient Education   Personalized Prevention Plan  You are due for the preventive services outlined below.  Your care team is available to assist you in scheduling these services.  If you have already completed any of these items, please share that information with your care team to update in your medical record.  Health Maintenance Due   Topic Date Due     Diabetic Foot Exam  Never done     ANNUAL REVIEW OF HM ORDERS  Never done     Pneumococcal Vaccine (1 of 2 - PPSV23) Never done     Colorectal Cancer Screening  Never done     Diptheria Tetanus Pertussis (DTAP/TDAP/TD) Vaccine (1 - Tdap) Never done     Zoster (Shingles) Vaccine (1 of 2) Never done     Eye Exam  06/06/2018     AORTIC ANEURYSM SCREENING (SYSTEM ASSIGNED)  Never done     Kidney Microalbumin Urine Test  07/31/2019     A1C Lab  05/25/2020     Basic Metabolic Panel  08/21/2020     Cholesterol Lab  08/21/2020     COVID-19 Vaccine (2 - Pfizer 2-dose series) 08/27/2021     Your Health Risk Assessment indicates you feel you are not in good health    A healthy lifestyle helps keep the body fit and the mind alert. It helps protect you from disease, helps you fight disease, and helps prevent chronic disease (disease that doesn't go away) from getting worse. This is important as you get older and begin to notice twinges in muscles and joints and a decline in the strength and stamina you once took for granted. A healthy lifestyle includes good healthcare, good nutrition, weight control, recreation, and regular exercise. Avoid harmful substances and do what you can to keep safe. Another part of a healthy lifestyle is stay mentally active and socially involved.    Good healthcare     Have a wellness visit every year.     If you have new symptoms, let us know right away. Don't wait until the next checkup.     Take medicines exactly as prescribed and keep your medicines in a safe place. Tell us if your medicine causes problems.   Healthy diet and weight control      Eat 3 or 4 small, nutritious, low-fat, high-fiber meals a day. Include a variety of fruits, vegetables, and whole-grain foods.     Make sure you get enough calcium in your diet. Calcium, vitamin D, and exercise help prevent osteoporosis (bone thinning).     If you live alone, try eating with others when you can. That way you get a good meal and have company while you eat it.     Try to keep a healthy weight. If you eat more calories than your body uses for energy, it will be stored as fat and you will gain weight.     Recreation   Recreation is not limited to sports and team events. It includes any activity that provides relaxation, interest, enjoyment, and exercise. Recreation provides an outlet for physical, mental, and social energy. It can give a sense of worth and achievement. It can help you stay healthy.    Mental Exercise and Social Involvement  Mental and emotional health is as important as physical health. Keep in touch with friends and family. Stay as active as possible. Continue to learn and challenge yourself.   Things you can do to stay mentally active are:    Learn something new, like a foreign language or musical instrument.     Play SCRABBLE or do crossword puzzles. If you cannot find people to play these games with you at home, you can play them with others on your computer through the Internet.     Join a games club--anything from card games to chess or checkers or lawn bowling.     Start a new hobby.     Go back to school.     Volunteer.     Read.   Keep up with world events.    Exercise for a Healthier Heart  You may wonder how you can improve the health of your heart. If you re thinking about exercise, you re on the right track. You don t need to become an athlete. But you do need a certain amount of brisk exercise to help strengthen your heart. If you have been diagnosed with a heart condition, your healthcare provider may advise exercise to help stabilize your condition. To help make  exercise a habit, choose safe, fun activities.      Exercise with a friend. When activity is fun, you're more likely to stick with it.   Before you start  Check with your healthcare provider before starting an exercise program. This is especially important if you have not been active for a while. It's also important if you have a long-term (chronic) health problem such as heart disease, diabetes, or obesity. Or if you are at high risk for having these problems.   Why exercise?  Exercising regularly offers many healthy rewards. It can help you do all of the following:     Improve your blood cholesterol level to help prevent further heart trouble    Lower your blood pressure to help prevent a stroke or heart attack    Control diabetes, or reduce your risk of getting this disease    Improve your heart and lung function    Reach and stay at a healthy weight    Make your muscles stronger so you can stay active    Prevent falls and fractures by slowing the loss of bone mass (osteoporosis)    Manage stress better    Reduce your blood pressure    Improve your sense of self and your body image  Exercise tips      Ease into your routine. Set small goals. Then build on them. If you are not sure what your activity level should be, talk with your healthcare provider first before starting an exercise routine.    Exercise on most days. Aim for a total of 150 minutes (2 hours and 30 minutes) or more of moderate-intensity aerobic activity each week. Or 75 minutes (1 hour and 15 minutes) or more of vigorous-intensity aerobic activity each week. Or try for a combination of both. Moderate activity means that you breathe heavier and your heart rate increases but you can still talk. Think about doing 40 minutes of moderate exercise, 3 to 4 times a week. For best results, activity should last for about 40 minutes to lower blood pressure and cholesterol. It's OK to work up to the 40-minute period over time. Examples of moderate-intensity  activity are walking 1 mile in 15 minutes. Or doing 30 to 45 minutes of yard work.    Step up your daily activity level.  Along with your exercise program, try being more active the whole day. Walk instead of drive. Or park further away so that you take more steps each day. Do more household tasks or yard work. You may not be able to meet the advised mount of physical activity. But doing some moderate- or vigorous-intensity aerobic activity can help reduce your risk for heart disease. Your healthcare provider can help you figure out what is best for you.    Choose 1 or more activities you enjoy.  Walking is one of the easiest things you can do. You can also try swimming, riding a bike, dancing, or taking an exercise class.    When to call your healthcare provider  Call your healthcare provider if you have any of these:     Chest pain or feel dizzy or lightheaded    Burning, tightness, pressure, or heaviness in your chest, neck, shoulders, back, or arms    Abnormal shortness of breath    More joint or muscle pain    A very fast or irregular heartbeat (palpitations)  [employer] last reviewed this educational content on 7/1/2019 2000-2021 [employer]. All rights reserved. This information is not intended as a substitute for professional medical care. Always follow your healthcare professional's instructions.        Activities of Daily Living    Your Health Risk Assessment indicates you have difficulties with activities of daily living such as housework, bathing, preparing meals, taking medication, etc. Please make a follow up appointment for us to address this issue in more detail.  Your Health Risk Assessment indicates you feel you are not in good emotional health.    Recreation   Recreation is not limited to sports and team events. It includes any activity that provides relaxation, interest, enjoyment, and exercise. Recreation provides an outlet for physical, mental, and social energy. It can give a  sense of worth and achievement. It can help you stay healthy.    Mental Exercise and Social Involvement  Mental and emotional health is as important as physical health. Keep in touch with friends and family. Stay as active as possible. Continue to learn and challenge yourself.   Things you can do to stay mentally active are:    Learn something new, like a foreign language or musical instrument.     Play SCRABBLE or do crossword puzzles. If you cannot find people to play these games with you at home, you can play them with others on your computer through the Internet.     Join a games club--anything from card games to chess or checkers or lawn bowling.     Start a new hobby.     Go back to school.     Volunteer.     Read.   Keep up with world events.

## 2021-08-24 NOTE — PROGRESS NOTES
"    The patient was provided with suggestions to help him develop a healthy physical lifestyle.  He is at risk for lack of exercise and has been provided with information to increase physical activity for the benefit of his well-being.  The patient reports that he has difficulty with activities of daily living. I have asked that the patient make a follow up appointment in 3 months weeks where this issue will be further evaluated and addressed.  The patient was provided with suggestions to help him develop a healthy emotional lifestyle.  Answers for HPI/ROS submitted by the patient on 8/24/2021  In general, how would you rate your overall physical health?: fair  Frequency of exercise:: 1 day/week  Do you usually eat at least 4 servings of fruit and vegetables a day, include whole grains & fiber, and avoid regularly eating high fat or \"junk\" foods? : Yes  Taking medications regularly:: Yes  Medication side effects:: None  Activities of Daily Living: telephone requires assistance, transportation requires assistance, shopping requires assistance, preparing meals requires assistance, housework requires assistance, bathing requires assistance, laundry requires assistance, medication administration requires assistance, money management requires assistance  Home safety: no safety concerns identified  Hearing Impairment:: no hearing concerns  In the past 6 months, have you been bothered by leaking of urine?: No  In general, how would you rate your overall mental or emotional health?: fair  Additional concerns today:: No  Duration of exercise:: 15-30 minutes      "

## 2021-08-25 NOTE — TELEPHONE ENCOUNTER
"Pt called back I relayed message  He will go to pharmacy and get meds and watch his diet no more pop   Attempted to call pt, to relay message will try agian.   \" Ok to relay message\" #1       ----- Message from Ethan Colunga MD sent at 8/25/2021  2:43 PM CDT -----  Please contact this patient.Let him know that the diabetes test was much worse, the A1c was up to 10.8 and his blood sugar was up to 293.Also his kidney function has worsened.He should stay off the NSAIDs, no Advil Aleve ibuprofen naproxen etc.Okay to use the acetaminophen that I sent to the pharmacy.I would like him to stay on the Metformin 500 mg 2 tablets twice a day with meals make sure to take that every day, but also go on an additional medicine called sitagliptin 50 mg 1 a day.  I sent the prescription to his pharmacyI need to see him back for recheck in 6 weeks to recheck on things bring in all the medications.Also let him know that the cholesterol level look good, and the hemoglobin was okay.  No additional change in medicine.    "

## 2021-08-25 NOTE — TELEPHONE ENCOUNTER
----- Message from Ethan Colunga MD sent at 8/25/2021  2:43 PM CDT -----  Please contact this patient.Let him know that the diabetes test was much worse, the A1c was up to 10.8 and his blood sugar was up to 293.Also his kidney function has worsened.He should stay off the NSAIDs, no Advil Aleve ibuprofen naproxen etc.Okay to use the acetaminophen that I sent to the pharmacy.I would like him to stay on the Metformin 500 mg 2 tablets twice a day with meals make sure to take that every day, but also go on an additional medicine called sitagliptin 50 mg 1 a day.  I sent the prescription to his pharmacyI need to see him back for recheck in 6 weeks to recheck on things bring in all the medications.Also let him know that the cholesterol level look good, and the hemoglobin was okay.  No additional change in medicine.

## 2021-10-06 NOTE — PROGRESS NOTES
[facility] Care Coordination Contact    10/6/21 - 1st attempt - Called member to schedule annual HRA home visit. Member's phone was disconnected and new other numbers on file or Epic.  Will try calling another day.    TIEN Saldivar  [facility]  [phone]

## 2021-10-15 NOTE — PROGRESS NOTES
[facility] Care Coordination Contact    Care Coordinator called [facility] - PCA provider for Lalit.  Care Coordinator left a voicemail requesting a return call as Care Coordinator is having trouble getting a hold of Lalit (all phone numbers inactive) for his PCA reassessment.    TIEN Saldivar  [facility]  [phone]

## 2021-10-15 NOTE — PROGRESS NOTES
2nd Attempt - 10/15/21 - 11:03am - Called member to schedule annual HRA home visit.  Phone number was disconnected.  Care Coordinator then called Lalit's daughter - Shani - and phone number was disconnected.  Then Care Coordinator went into [facility] and found Lalit's son's contact number and called him and the phone number was also disconnected.  Care Coordinator will call PCA agency to get an updated contact number for Lalit.    Maria Esther Singh, HERNANDO  [facility]  [phone]

## 2021-11-05 NOTE — PROGRESS NOTES
[facility] Care Coordination Contact    Called member to schedule annual HRA home visit. HRA has been scheduled for Friday, November 12 at 1:00pm.     TIEN Saldivar  [facility]  [phone]

## 2021-11-16 NOTE — PROGRESS NOTES
[facility] Care Coordination Contact    [facility] Home Visit Assessment     Home visit for Health Risk Assessment with Lalit Geiger completed on November 12, 2021    Type of residence:: Private home - no stairs  Current living arrangement:: I live in a private home (I have a roommate)     Assessment completed with:: Patient    Current Care Plan  Member currently receiving the following home care services:     Member currently receiving the following community resources: PCA,Housekeeping/Chore Agency,Lif[employer]    Medication Review  Medication reconciliation completed in Epic: Yes  Medication set-up & administration: Independent-does not set up.  Self-administers medications.  Medication Risk Assessment Medication (1 or more, place referral to MTM): N/A: No risk factors identified  MTM Referral Placed: No: No risk factors idenified    Mental/Behavioral Health   Depression Screening:   PHQ-2 Total Score (Adult) - Positive if 3 or more points; Administer PHQ-9 if positive: 0       Mental health DX:: No        Falls Assessment:   Fallen 2 or more times in the past year?: No   Any fall with injury in the past year?: No    ADL/IADL Dependencies:   Dependent ADLs:: Dressing,Bathing,Transfers,Ambulation-cane  Dependent IADLs:: Cleaning,Cooking,Laundry,Shopping,Meal Preparation,Transportation    [facility] sponsored benefits: Shared information re: Silver Sneakers/gym memberships, ASA, Calcium +D.    PCA Assessment completed at visit: Yes Annual PCA assessment indicated 14 units per day of PCA. This is an increase from the previous assessment.     Elderly Waiver Eligibility: Yes-will continue on EW    Care Plan & Recommendations: Annual Health Risk Assessment completed via phone with Lalit.  Lalit shared that he has more pain this year and is needing more help.  Lalit has PCA services daily as well as homemaking, lifeline and a bus card. Lalit reports he lost his lifeline button and requested a new button that  he can wear around his wrist.  Care Coordinator will order.  Care Coordinator completed the PCA reassessment with Lalit and this year his PCA assessment determines he qualifies for 3.5 hours per PCA a day.  Lalit wants to continue with all formal services.  Lalit reports no falls, no hospital stays and no ER visits in the past year.  No other questions, concerns or needs at this time.    See [facility] for detailed assessment information.    Follow-Up Plan: Member informed of future contact, plan to f/u with member with a 6 month telephone assessment.  Contact information shared with member and family, encouraged member to call with any questions or concerns at any time.    [facility] care continuum providers: Please refer to Health Care Home on the Epic Problem List to view this patient's [facility] Care Plan Summary.    TIEN Saldivar  [facility]  [phone]

## 2021-11-18 NOTE — PROGRESS NOTES
[facility] Care Coordination Contact    Received after visit chart from care coordinator.  Completed following tasks:    Mailed copy of care plan to client, Updated services in access and Submitted referrals/auths for 3 hrs/wk HMK with [facility] effective 12/1/21-11/30/22.     Provider Signature - No POC Shared:  Member indicates that they do not want their POC shared with any EW providers.    [employer]:  Faxed completed PCA assessment to PCA Agency and mailed copies to member.  Faxed MD Communication to PCP.  Emailed referral form for auth to [employer].    Mailed POC Sig and PCA Sig pages to member with a stamped return envelope.     Jarrett Curtis  Care Management Specialist  [facility]  [phone]

## 2021-11-18 NOTE — LETTER
November 18, 2021    Important [employer] Information    EDWARDO BOYLE  [address] [place] [address] [address] [place] [address]  Your Care Plan  Dear Edwardo,  When we spoke recently, I promised to send you a Care Plan. The plan enclosed is a summary of our discussion. It includes the steps we agreed would help you meet your health goals. In addition, I can help you with:  Zpmrkid-Q-AztbAV  This program is available to members who need a ride to medical and dental visits. To schedule a ride, call [phone] or [phone] (toll free). TTY: 711. You can call 8 a.m. to 8 p.m. Seven days a week. Access to a representative may be limited at times.    [employer]   The [employer] program empowers you to improve your health through education and exercise. To learn more, visit [employer], or call [employer] at [phone] (toll free) (TTY:711) from 7 a.m. - 7 p.m. Central Time, Monday-Friday.  Health Care Directive   This form helps you outline your health care wishes. You can request a form from me and I will answer any questions you have before you discuss it with your doctor.   Annual Physical  Take a key step on your path to good health and set up an annual physical at your clinic.  Questions?  Call me at [phone] Monday-Friday between 8am and 5pm.  TTY: 711. As we discussed, I plan to be in touch with you again in 6 months to follow up via phone.  Sincerely,      TIEN Saldivar  [phone]  [email or web address]    cc: member records                                                                                             [place] ([facility] (5-[place])    Civil Rights Notice  Discrimination is against the law. [employer] does not discriminate on the basis of any of the following:    Race    Color    National Origin    Creed    Moravian    Age    Public Assistance Status    Receipt of Health Care Services    Disability (including physical or mental impairment)    Sex (including sex  stereotypes and gender identity)    Marital Status    Political Beliefs    Medical Condition    Genetic Information    Sexual Orientation    Claims Experience    Medical History    Health Status    Auxiliary Aids and Services:  [employer] provides auxiliary aids and services, like qualified interpreters or information in accessible formats, free of charge and in a timely manner, to ensure an equal opportunity to participate in our health care programs. Contact [employer] at [employer]contact medicaid or call [phone] (toll free); TTY:715 or at [employer]contactAmbient Clinical Analyticscaid.    Language Assistance Services:  [employer] provides translated documents and spoken language interpreting, free of charge and in a timely manner, when language assistance services are necessary to ensure limited English speakers have meaningful access to our information and services. Contact [employer] at [phone] (toll free); TTY: 962 or [employer]contactmedicaid.     Civil Rights Complaints  You have the right to file a discrimination complaint if you believe you were treated in a discriminatory way by [employer]. You may contact any of the following four agencies directly to file a discrimination complaint.    U.S. Department of Health and Human Services  Office for Civil Rights (OCR)  You have the right to file a complaint with the OCR, a federal agency, if you believe you have been discriminated against because of any of the following:    Race    Disability    Color    Sex    National Origin    Age    Latter-day (in some cases)    Contact the OCR directly to file a complaint:         Director         U.S. Department of Health and Human Services  Office for Civil Rights         [address] [place] [address]         Customer Response Center: Toll-free: [phone]          TDD: [phone]         Email: [email or web address].gov    [place] Department of Human Rights ([facility])  In [place], you  have the right to file a complaint with the MDHR if you believe you have been discriminated against because of any of the following:      Race    Color    National Origin    Sikh    Creed    Sex    Sexual Orientation    Marital Status    Public Assistance Status    Disability    Contact the MDHR directly to file a complaint:         [facility] of Human Rights         [address] [address]         [phone] (voice)          [phone] (toll free)         711 or [phone] ([place] Relay)         [phone] (Fax)         [email or web address] (Email)     [place] Department of Human Services (DHS)  You have the right to file a complaint with [facility] if you believe you have been discriminated against in our health care programs because of any of the following:    Race    Color    National Origin    Creed    Sikh    Age    Public Assistance Status    Receipt of Health Care Services    Disability (including physical or mental impairment)    Sex (including sex stereotypes and gender identity)    Marital Status    Political Beliefs    Medical Condition    Genetic Information    Sexual Orientation    Claims Experience    Medical History    Health Status    Complaints must be in writing and filed within 180 days of the date you discovered the alleged discrimination. The complaint must contain your name and address and describe the discrimination you are complaining about. After we get your complaint, we will review it and notify you in writing about whether we have authority to investigate. If we do, we will investigate the complaint.      [facility] will notify you in writing of the investigation s outcome. You have a right to appeal the outcome if you disagree with the decision. To appeal, you must send a written request to have [facility] review the investigation outcome. Be brief and state why you disagree with the decision. Include additional information you think is  important.      If you file a complaint in this way, the people who work for the agency named in the complaint cannot retaliate against you. This means they cannot punish you in any way for filing a complaint. Filing a complaint in this way does not stop you from seeking out other legal or administration actions.     Contact DHS directly to file a discrimination complaint:        Civil Rights Coordinator        [facility] of Human Services        Equal Opportunity and Access Division        [address] [address]        [place] [address] [phone] (voice) or use your preferred relay service     [employer] Complaint Notice   You have the right to file a complaint with [employer] if you believe you have been discriminated against because of any of the following:       Medical condition    Health status    Receipt of health care services    Claims experience    Medical history    Genetic information    Disability (including mental or physical impairment)    Marital status    Age    Sex (including sex stereotypes and gender identity)    Sexual orientation    National origin    Race    Color    Nondenominational    Creed    Public assistance status    Political beliefs    You can file a complaint and ask for help in filing a complaint in person or by mail, phone, fax, or email at:     [employer] Civil Rights Coordinator  [facility] [employer]  [address], [place] [address] [phone] (voice and fax) or TTQ:574  Email: [email or web address]    American Indians can begin or continue to use [facility] and  Health Services (I[facility]) clinics. We will not require prior approval or impose any conditions for you to get services at these clinics. For elders age 65 years and older this includes Elderly Waiver (EW) services accessed through the [facility]. If a doctor or other provider in a [facility] or [facility] clinic refers you to a provider in our network, we will not require you to see your primary care  provider prior to the referral.

## 2021-11-26 NOTE — PROGRESS NOTES
[facility] Care Coordination Contact    Completed following tasks:    Updated services in access and Submitted referrals/auths for Pers one time purchase wrist band button $$47.00 with [employer] effective 12/1/21-11/30/22.    Jarrett Curtis  Care Management Specialist  [facility]  [phone]

## 2021-12-01 NOTE — PROGRESS NOTES
Confirmed [facility] Auth from 12/1/21-11/30/22,  AUTH #[id number].    Jarrett Curtis  Care Management Specialist  [facility]  [phone]

## 2021-12-01 NOTE — PROGRESS NOTES
PCA Auth # [id number], confirmed from [employer].    Jarrett [facility]  Care Management Specialist  [facility]  [phone]

## 2021-12-02 NOTE — TELEPHONE ENCOUNTER
The pharmacy is asking for a refill on Celcoxib 200mg Caps, this was declined by Dr. Colunga on 11/30/21. Tried to call the pt to see why this is needed or if this is needed, had to LVM #1.    When the pt calls back, please find out why the pt is needing this medication or if it is needed and then send to Dr. Colunga if the pt is needing this refilled.

## 2021-12-03 NOTE — PROGRESS NOTES
[employer] (wrist band) auth confirmed. Auth #[id number].     Jarrett Curtis  Care Management Specialist  [facility]  [phone]

## 2021-12-03 NOTE — TELEPHONE ENCOUNTER
Reason for Call:  Other   Detailed comments: He was found  in bed this morning,refused to go to ER per his roommate he just wanted to be left alone roommate saw him alive   @1030 . non compliant diabetic ,natural causes .they will not be doing autopsy   Would like a call back to get verbal that  will sign death certificate    Phone Number Patient can be reached at: [phone]    Best Time: anytime    Can we leave a detailed message on this number? YES    Call taken on 12/3/2021 at 11:11 AM by Elaine Gómez

## 2021-12-08 NOTE — PROGRESS NOTES
The PCA sig page and POC sig page were received and saved in member folder.     Jarrett Curtis  Care Management Specialist  [facility]  [phone]

## 2022-02-02 ENCOUNTER — PATIENT OUTREACH (OUTPATIENT)
Dept: GERIATRIC MEDICINE | Facility: CLINIC | Age: 67
End: 2022-02-02
Payer: COMMERCIAL